# Patient Record
Sex: FEMALE | Race: WHITE | NOT HISPANIC OR LATINO | Employment: FULL TIME | URBAN - METROPOLITAN AREA
[De-identification: names, ages, dates, MRNs, and addresses within clinical notes are randomized per-mention and may not be internally consistent; named-entity substitution may affect disease eponyms.]

---

## 2017-02-24 ENCOUNTER — OFFICE VISIT (OUTPATIENT)
Dept: OPTOMETRY | Facility: CLINIC | Age: 57
End: 2017-02-24
Payer: COMMERCIAL

## 2017-02-24 DIAGNOSIS — H52.13 MYOPIA, BILATERAL: ICD-10-CM

## 2017-02-24 DIAGNOSIS — H25.13 NS (NUCLEAR SCLEROSIS), BILATERAL: ICD-10-CM

## 2017-02-24 DIAGNOSIS — Z46.0 FITTING AND ADJUSTMENT OF SPECTACLES AND CONTACT LENSES: Primary | ICD-10-CM

## 2017-02-24 DIAGNOSIS — I10 ESSENTIAL HYPERTENSION: Primary | ICD-10-CM

## 2017-02-24 DIAGNOSIS — Z83.511 FAMILY HISTORY OF GLAUCOMA IN BROTHER: ICD-10-CM

## 2017-02-24 DIAGNOSIS — H52.4 PRESBYOPIA: ICD-10-CM

## 2017-02-24 PROCEDURE — 92015 DETERMINE REFRACTIVE STATE: CPT | Mod: S$GLB,,, | Performed by: OPTOMETRIST

## 2017-02-24 PROCEDURE — 92310 CONTACT LENS FITTING OU: CPT | Mod: ,,, | Performed by: OPTOMETRIST

## 2017-02-24 PROCEDURE — 92014 COMPRE OPH EXAM EST PT 1/>: CPT | Mod: S$GLB,,, | Performed by: OPTOMETRIST

## 2017-02-24 PROCEDURE — 99999 PR PBB SHADOW E&M-EST. PATIENT-LVL II: CPT | Mod: PBBFAC,,, | Performed by: OPTOMETRIST

## 2017-02-24 NOTE — PROGRESS NOTES
HPI     Patient's age: 56 y.o.    Approximate date of last eye examination:  3/27/15  Name of last eye doctor seen: Dr. Saldana    Pt states that she is here for exam need another prescription for   contacts, may need to give it a little bump up, eye glasses are fine.    Wears glasses? yes       Wears CLs?:  yes             Headaches?  no  Eye pain/discomfort?  no                                                                                     Flashes?  no  Floaters?  no  Diplopia/Double vision?  no    Patient's Ocular History:         Any eye surgeries? no         Family history of eye disease?  Glaucoma - Brother,  Age Related   Macula Degeneration - Father    Significant patient medical history:         1. Diabetes?  no       If yes, IDDM or NIDDM? no   2. HBP?  no                 ! OTC eyedrops currently using:  Refresh - OU PRN   ! Prescription eye meds currently using:  none            Last edited by Sherri Bedoya MA on 2/24/2017  9:25 AM.         Assessment /Plan     For exam results, see Encounter Report.    Essential hypertension    No retinopathy, monitor yearly    NS (nuclear sclerosis), bilateral   Mild, monitor    Myopia, bilateral  Presbyopia   Rx specs   CL fit today: change Power OU   Remove nightly, replace monthly   OK to order if  Happy    Family history of glaucoma in brother   IOP and optic nerve WNL OU today   Monitor yearly

## 2017-07-25 ENCOUNTER — OFFICE VISIT (OUTPATIENT)
Dept: INTERNAL MEDICINE | Facility: CLINIC | Age: 57
End: 2017-07-25
Payer: COMMERCIAL

## 2017-07-25 ENCOUNTER — LAB VISIT (OUTPATIENT)
Dept: LAB | Facility: OTHER | Age: 57
End: 2017-07-25
Attending: INTERNAL MEDICINE
Payer: COMMERCIAL

## 2017-07-25 VITALS
WEIGHT: 169.56 LBS | OXYGEN SATURATION: 99 % | DIASTOLIC BLOOD PRESSURE: 72 MMHG | HEIGHT: 70 IN | HEART RATE: 55 BPM | BODY MASS INDEX: 24.27 KG/M2 | SYSTOLIC BLOOD PRESSURE: 116 MMHG

## 2017-07-25 DIAGNOSIS — Z00.00 WELLNESS EXAMINATION: Primary | ICD-10-CM

## 2017-07-25 DIAGNOSIS — Z00.00 WELLNESS EXAMINATION: ICD-10-CM

## 2017-07-25 LAB
ALBUMIN SERPL BCP-MCNC: 4.5 G/DL
ALP SERPL-CCNC: 81 U/L
ALT SERPL W/O P-5'-P-CCNC: 18 U/L
ANION GAP SERPL CALC-SCNC: 12 MMOL/L
AST SERPL-CCNC: 23 U/L
BASOPHILS # BLD AUTO: 0.09 K/UL
BASOPHILS NFR BLD: 1.9 %
BILIRUB SERPL-MCNC: 0.7 MG/DL
BUN SERPL-MCNC: 10 MG/DL
CALCIUM SERPL-MCNC: 10 MG/DL
CHLORIDE SERPL-SCNC: 102 MMOL/L
CHOLEST/HDLC SERPL: 2.6 {RATIO}
CO2 SERPL-SCNC: 25 MMOL/L
CREAT SERPL-MCNC: 0.8 MG/DL
DIFFERENTIAL METHOD: ABNORMAL
EOSINOPHIL # BLD AUTO: 0.3 K/UL
EOSINOPHIL NFR BLD: 6.5 %
ERYTHROCYTE [DISTWIDTH] IN BLOOD BY AUTOMATED COUNT: 13.3 %
EST. GFR  (AFRICAN AMERICAN): >60 ML/MIN/1.73 M^2
EST. GFR  (NON AFRICAN AMERICAN): >60 ML/MIN/1.73 M^2
GLUCOSE SERPL-MCNC: 95 MG/DL
HCT VFR BLD AUTO: 41 %
HDL/CHOLESTEROL RATIO: 38.3 %
HDLC SERPL-MCNC: 243 MG/DL
HDLC SERPL-MCNC: 93 MG/DL
HGB BLD-MCNC: 13.4 G/DL
LDLC SERPL CALC-MCNC: 137.4 MG/DL
LYMPHOCYTES # BLD AUTO: 2.2 K/UL
LYMPHOCYTES NFR BLD: 46.8 %
MCH RBC QN AUTO: 29.8 PG
MCHC RBC AUTO-ENTMCNC: 32.7 G/DL
MCV RBC AUTO: 91 FL
MONOCYTES # BLD AUTO: 0.5 K/UL
MONOCYTES NFR BLD: 10.1 %
NEUTROPHILS # BLD AUTO: 1.7 K/UL
NEUTROPHILS NFR BLD: 34.5 %
NONHDLC SERPL-MCNC: 150 MG/DL
PLATELET # BLD AUTO: 338 K/UL
PMV BLD AUTO: 9.6 FL
POTASSIUM SERPL-SCNC: 4.3 MMOL/L
PROT SERPL-MCNC: 7.6 G/DL
RBC # BLD AUTO: 4.49 M/UL
SODIUM SERPL-SCNC: 139 MMOL/L
TRIGL SERPL-MCNC: 63 MG/DL
TSH SERPL DL<=0.005 MIU/L-ACNC: 2.54 UIU/ML
WBC # BLD AUTO: 4.77 K/UL

## 2017-07-25 PROCEDURE — 80061 LIPID PANEL: CPT

## 2017-07-25 PROCEDURE — 99396 PREV VISIT EST AGE 40-64: CPT | Mod: S$GLB,,, | Performed by: INTERNAL MEDICINE

## 2017-07-25 PROCEDURE — 80053 COMPREHEN METABOLIC PANEL: CPT

## 2017-07-25 PROCEDURE — 99999 PR PBB SHADOW E&M-EST. PATIENT-LVL III: CPT | Mod: PBBFAC,,, | Performed by: INTERNAL MEDICINE

## 2017-07-25 PROCEDURE — 36415 COLL VENOUS BLD VENIPUNCTURE: CPT

## 2017-07-25 PROCEDURE — 85025 COMPLETE CBC W/AUTO DIFF WBC: CPT

## 2017-07-25 PROCEDURE — 84443 ASSAY THYROID STIM HORMONE: CPT

## 2017-07-25 NOTE — PROGRESS NOTES
Subjective:       Patient ID: Sandra Mitchell is a 56 y.o. female.    Chief Complaint: Annual Exam    Pt here for annual exam. Feels well. Counseled on exercise goals. c-scope is up to date. She is up to date with GYN for WWE and mammogram.           Review of Systems   Constitutional: Negative for activity change, fatigue, fever and unexpected weight change.   HENT: Negative for congestion, hearing loss, rhinorrhea, sore throat and trouble swallowing.    Eyes: Negative for discharge and visual disturbance.   Respiratory: Negative for chest tightness, shortness of breath and wheezing.    Cardiovascular: Negative for chest pain, palpitations and leg swelling.   Gastrointestinal: Negative for abdominal pain, blood in stool, constipation, diarrhea and vomiting.   Endocrine: Negative for polydipsia and polyuria.   Genitourinary: Negative for difficulty urinating, dysuria, hematuria and menstrual problem.   Musculoskeletal: Negative for arthralgias, joint swelling and neck pain.   Skin: Negative for rash.   Neurological: Negative for dizziness, syncope, weakness and headaches.   Hematological: Negative for adenopathy.   Psychiatric/Behavioral: Negative for confusion and dysphoric mood.       Objective:      Physical Exam   Constitutional: She is oriented to person, place, and time. She appears well-developed and well-nourished.   HENT:   Head: Normocephalic.   Right Ear: Tympanic membrane, external ear and ear canal normal.   Left Ear: Tympanic membrane, external ear and ear canal normal.   Nose: Nose normal.   Mouth/Throat: Uvula is midline and oropharynx is clear and moist.   Eyes: Conjunctivae, EOM and lids are normal. Pupils are equal, round, and reactive to light. Right conjunctiva is not injected. Left conjunctiva is not injected.   Neck: Neck supple. No JVD present. Carotid bruit is not present. No thyroid mass and no thyromegaly present.   Cardiovascular: Normal rate, regular rhythm, S1 normal, S2  normal and intact distal pulses.  PMI is not displaced.    No murmur heard.  Pulses:       Posterior tibial pulses are 2+ on the right side, and 2+ on the left side.   Pulmonary/Chest: Effort normal and breath sounds normal. She has no wheezes. She has no rhonchi. She has no rales.   Abdominal: Soft. Bowel sounds are normal. She exhibits no distension and no abdominal bruit. There is no hepatosplenomegaly. There is no tenderness.   Musculoskeletal: She exhibits no edema.   Lymphadenopathy:        Head (right side): No preauricular and no posterior auricular adenopathy present.        Head (left side): No preauricular and no posterior auricular adenopathy present.     She has no cervical adenopathy.     She has no axillary adenopathy.   Neurological: She is alert and oriented to person, place, and time. She has normal strength. No cranial nerve deficit or sensory deficit.   Reflex Scores:       Patellar reflexes are 2+ on the right side and 2+ on the left side.       Achilles reflexes are 2+ on the right side and 2+ on the left side.  Skin: Skin is warm. No rash noted.   Psychiatric: She has a normal mood and affect. Her speech is normal and behavior is normal. Judgment and thought content normal.       Assessment:       1. Wellness examination        Plan:       1. Appropriate labs

## 2018-03-21 ENCOUNTER — TELEPHONE (OUTPATIENT)
Dept: INTERNAL MEDICINE | Facility: CLINIC | Age: 58
End: 2018-03-21

## 2018-03-21 ENCOUNTER — PATIENT OUTREACH (OUTPATIENT)
Dept: ADMINISTRATIVE | Facility: HOSPITAL | Age: 58
End: 2018-03-21

## 2018-03-21 NOTE — PROGRESS NOTES
[facility] is committed to your overall health.  To help you get the most out of each of your visits, we will review your information to make sure you are up to date on all of your recommended tests and/or procedures.       Your PCP  Kota Valdez MD   found that you may be due for:       Health Maintenance Due   Topic Date Due    Mammogram  12/22/2017     Please be prepared to sign release of information so that we obtain medical records for care that you received outside of [facility]. This is done to make sure your medical record is complete and that you receive the best of care. Thank you.           If you have had any of the above done at another facility, please bring the records or information with you so that your record at [facility] will be complete.  If you would like to schedule any of these, please contact me.     If you are currently taking medication, please bring it with you to your appointment for review.     Also, if you have any type of Advanced Directives, please bring them with you to your office visit so we may scan them into your chart.       Thank you for Choosing [facility] for your healthcare needs.        Additional Information  If you have questions, you can email [email or web address] or call [phone]  to talk to our [facility] staff. Remember, [facility] is NOT to be used for urgent needs. For medical emergencies, dial 911.

## 2018-03-21 NOTE — TELEPHONE ENCOUNTER
----- Message from Jodie Grady sent at 3/21/2018  4:19 PM CDT -----  Contact: pt  x_  1st Request  _  2nd Request  _  3rd Request    Who: EVAN CHINCHILLA [[id number]]    Why: Patient is returning a call. Please call before the day is out.    What Number to Call Back:[phone]    When to Expect a call back: (Within 24 hours)    Please return the call at earliest convenience. Thanks!

## 2018-03-21 NOTE — TELEPHONE ENCOUNTER
FYI: Pt states she is under a lot of stress and causing possible shingles. C/o the following:  Large patch of rash(3x3) on back near rib cage, quarter size sternum area, tenderness under arm that wraps near outbreak on back. Noticed last week that she possibly had an outbreak on her gumline. Has chicken pox as an infant. Pt has been scheduled for appt to eval on tomorrow.

## 2018-03-22 ENCOUNTER — OFFICE VISIT (OUTPATIENT)
Dept: INTERNAL MEDICINE | Facility: CLINIC | Age: 58
End: 2018-03-22
Payer: COMMERCIAL

## 2018-03-22 VITALS
DIASTOLIC BLOOD PRESSURE: 64 MMHG | WEIGHT: 172.38 LBS | BODY MASS INDEX: 24.68 KG/M2 | SYSTOLIC BLOOD PRESSURE: 120 MMHG | HEART RATE: 57 BPM | HEIGHT: 70 IN

## 2018-03-22 DIAGNOSIS — Z12.39 SCREENING FOR BREAST CANCER: ICD-10-CM

## 2018-03-22 DIAGNOSIS — B02.9 HERPES ZOSTER WITHOUT COMPLICATION: Primary | ICD-10-CM

## 2018-03-22 PROCEDURE — 99213 OFFICE O/P EST LOW 20 MIN: CPT | Mod: S$GLB,,, | Performed by: INTERNAL MEDICINE

## 2018-03-22 PROCEDURE — 3074F SYST BP LT 130 MM HG: CPT | Mod: CPTII,S$GLB,, | Performed by: INTERNAL MEDICINE

## 2018-03-22 PROCEDURE — 99999 PR PBB SHADOW E&M-EST. PATIENT-LVL III: CPT | Mod: PBBFAC,,, | Performed by: INTERNAL MEDICINE

## 2018-03-22 PROCEDURE — 3078F DIAST BP <80 MM HG: CPT | Mod: CPTII,S$GLB,, | Performed by: INTERNAL MEDICINE

## 2018-03-22 RX ORDER — VALACYCLOVIR HYDROCHLORIDE 1 G/1
1000 TABLET, FILM COATED ORAL 3 TIMES DAILY
Qty: 21 TABLET | Refills: 0 | Status: SHIPPED | OUTPATIENT
Start: 2018-03-22 | End: 2019-08-01

## 2018-03-22 RX ORDER — GABAPENTIN 300 MG/1
300 CAPSULE ORAL 2 TIMES DAILY
Qty: 30 CAPSULE | Refills: 0 | Status: SHIPPED | OUTPATIENT
Start: 2018-03-22 | End: 2019-08-01

## 2018-03-22 NOTE — TELEPHONE ENCOUNTER
----- Message from Do Pereyra sent at 3/22/2018  8:31 AM CDT -----  Contact: EVAN CHINCHILLA [[id number]]  x_  1st Request  _  2nd Request  _  3rd Request        Who: EVAN CHINCHILLA [[id number]]    Why: Returning a call. Patient states she think she may have the shingles. Patient states there is nothing wrong with her eyes she has two rash patches. Please return the call at earliest convenience. Thanks!    What Number to Call Back: [phone]      When to Expect a call back: (Within 24 hours)

## 2018-03-22 NOTE — TELEPHONE ENCOUNTER
Left a 2nd messge regarding eye symptoms. pt was advised in the message to contact our office for more informating regarding symptoms. CF

## 2018-04-26 ENCOUNTER — OFFICE VISIT (OUTPATIENT)
Dept: OPTOMETRY | Facility: CLINIC | Age: 58
End: 2018-04-26
Payer: COMMERCIAL

## 2018-04-26 DIAGNOSIS — Z83.518 FAMILY HISTORY OF MACULAR DEGENERATION: ICD-10-CM

## 2018-04-26 DIAGNOSIS — H25.13 NS (NUCLEAR SCLEROSIS), BILATERAL: Primary | ICD-10-CM

## 2018-04-26 DIAGNOSIS — H52.4 PRESBYOPIA: ICD-10-CM

## 2018-04-26 DIAGNOSIS — I10 ESSENTIAL HYPERTENSION: ICD-10-CM

## 2018-04-26 DIAGNOSIS — Z46.0 FITTING AND ADJUSTMENT OF SPECTACLES AND CONTACT LENSES: Primary | ICD-10-CM

## 2018-04-26 DIAGNOSIS — Z83.511 FAMILY HISTORY OF GLAUCOMA IN BROTHER: ICD-10-CM

## 2018-04-26 DIAGNOSIS — H52.13 MYOPIA, BILATERAL: ICD-10-CM

## 2018-04-26 PROCEDURE — 99999 PR PBB SHADOW E&M-EST. PATIENT-LVL II: CPT | Mod: PBBFAC,,, | Performed by: OPTOMETRIST

## 2018-04-26 PROCEDURE — 92310 CONTACT LENS FITTING OU: CPT | Mod: S$GLB,,, | Performed by: OPTOMETRIST

## 2018-04-26 PROCEDURE — 92014 COMPRE OPH EXAM EST PT 1/>: CPT | Mod: S$GLB,,, | Performed by: OPTOMETRIST

## 2018-04-26 PROCEDURE — 92015 DETERMINE REFRACTIVE STATE: CPT | Mod: S$GLB,,, | Performed by: OPTOMETRIST

## 2018-04-26 RX ORDER — 1.1% SODIUM FLUORIDE PRESCRIPTION DENTAL CREAM 5 MG/G
CREAM DENTAL
Refills: 0 | COMMUNITY
Start: 2018-03-24

## 2018-04-26 NOTE — PROGRESS NOTES
HPI     Patient's age: 56 y.o.    Approximate date of last eye examination:  2/24/17  Name of last eye doctor seen: Dr. Saldana    Pt states that she is here for exam need another prescription for  glasses   and contacts, feel as if eye shifted some with vision.    Wears glasses? yes       Wears CLs?:  yes             Headaches?  no  Eye pain/discomfort?  no                                                                                     Flashes?  no  Floaters?  no  Diplopia/Double vision?  no    Patient's Ocular History:         Any eye surgeries? no         Family history of eye disease?  Glaucoma - Brother,  Age Related   Macula Degeneration - Father    Significant patient medical history:         1. Diabetes?  no       If yes, IDDM or NIDDM? no   2. HBP?  no                 ! OTC eyedrops currently using:  Refresh - OU PRN   ! Prescription eye meds currently using:  none         Last edited by Sherri Bedoya MA on 4/26/2018  9:21 AM. (History)            Assessment /Plan     For exam results, see Encounter Report.        Family history of macular degeneration: father    Essential hypertension              No retinopathy, monitor yearly     NS (nuclear sclerosis), bilateral               Mild, monitor     Myopia, bilateral  Presbyopia               Rx specs               CL fit today: no change OU               Remove nightly, replace monthly               OK to order if  Happy     Family history of glaucoma in brother               IOP and optic nerve appearance WNL OU today               Monitor yearly

## 2018-06-04 ENCOUNTER — OFFICE VISIT (OUTPATIENT)
Dept: OBSTETRICS AND GYNECOLOGY | Facility: CLINIC | Age: 58
End: 2018-06-04
Attending: OBSTETRICS & GYNECOLOGY
Payer: COMMERCIAL

## 2018-06-04 VITALS
WEIGHT: 169.56 LBS | BODY MASS INDEX: 24.27 KG/M2 | SYSTOLIC BLOOD PRESSURE: 118 MMHG | DIASTOLIC BLOOD PRESSURE: 74 MMHG | HEIGHT: 70 IN

## 2018-06-04 DIAGNOSIS — Z12.31 SCREENING MAMMOGRAM, ENCOUNTER FOR: ICD-10-CM

## 2018-06-04 DIAGNOSIS — N95.2 POSTMENOPAUSAL ATROPHIC VAGINITIS: ICD-10-CM

## 2018-06-04 DIAGNOSIS — Z01.419 ENCOUNTER FOR GYNECOLOGICAL EXAMINATION WITHOUT ABNORMAL FINDING: Primary | ICD-10-CM

## 2018-06-04 PROCEDURE — 3074F SYST BP LT 130 MM HG: CPT | Mod: CPTII,S$GLB,, | Performed by: OBSTETRICS & GYNECOLOGY

## 2018-06-04 PROCEDURE — 99396 PREV VISIT EST AGE 40-64: CPT | Mod: S$GLB,,, | Performed by: OBSTETRICS & GYNECOLOGY

## 2018-06-04 PROCEDURE — 3078F DIAST BP <80 MM HG: CPT | Mod: CPTII,S$GLB,, | Performed by: OBSTETRICS & GYNECOLOGY

## 2018-06-04 PROCEDURE — 99999 PR PBB SHADOW E&M-EST. PATIENT-LVL III: CPT | Mod: PBBFAC,,, | Performed by: OBSTETRICS & GYNECOLOGY

## 2018-06-04 RX ORDER — ESTRADIOL 0.1 MG/G
0.5 CREAM VAGINAL
Qty: 42 G | Refills: 4 | Status: SHIPPED | OUTPATIENT
Start: 2018-06-04 | End: 2019-02-06 | Stop reason: SDUPTHER

## 2018-06-04 NOTE — PROGRESS NOTES
Subjective:       Patient ID: Sandra Mitchell is a 57 y.o. female.    Chief Complaint:  Well Woman      History of Present Illness  HPI    Sandra Mitchell is a 57 y.o. female  here for her annual GYN exam.    She describes her periods as stopped with menpause about 11 years ago,  denies break through bleeding.   denies vaginal itching or irritation.  Denies vaginal discharge.  She is sexually active. She has had 1 partner for the past 12 years ( x 4 years) .   History of abnormal pap: No  Last Pap: approximate date  and was normal  Last MMG: normal--routine follow-up in 12 months  Last Colonoscopy:  colonoscopy 5 years ago without abnormalities.  denies domestic violence. She approximate date  and was normal feel safe at home.     History reviewed. No pertinent past medical history.  Past Surgical History:   Procedure Laterality Date    APPENDECTOMY      BREAST CYST INCISION AND DRAINAGE      DILATION AND CURETTAGE OF UTERUS      TUBAL LIGATION      Bilateral salpingectomy     Social History     Social History    Marital status:      Spouse name: N/A    Number of children: N/A    Years of education: N/A     Occupational History    Not on file.     Social History Main Topics    Smoking status: Never Smoker    Smokeless tobacco: Never Used    Alcohol use 2.4 oz/week     4 Glasses of wine per week      Comment: per week    Drug use: No    Sexual activity: Yes     Partners: Male     Birth control/ protection: Post-menopausal      Comment:  x 2 years     Other Topics Concern    Not on file     Social History Narrative    No narrative on file     Family History   Problem Relation Age of Onset    Cataracts Mother     Dementia Mother     Alzheimer's disease Mother     Macular degeneration Father     Heart disease Father         CABG    Diabetes Father     Hypertension Father     Amblyopia Neg Hx     Blindness Neg Hx     Glaucoma Neg Hx      "Retinal detachment Neg Hx     Strabismus Neg Hx     Breast cancer Neg Hx     Colon cancer Neg Hx     Ovarian cancer Neg Hx     Stroke Neg Hx      OB History      Para Term  AB Living    0              SAB TAB Ectopic Multiple Live Births                       /74   Ht 5' 10" (1.778 m)   Wt 76.9 kg (169 lb 8.5 oz)   LMP 2007   BMI 24.33 kg/m²         GYN & OB History  Patient's last menstrual period was 2007.   Date of Last Pap: 2016    OB History    Para Term  AB Living   0             SAB TAB Ectopic Multiple Live Births                         Review of Systems  Review of Systems   Constitutional: Negative for activity change, appetite change, fatigue and unexpected weight change.   HENT: Negative.    Eyes: Negative for visual disturbance.   Respiratory: Negative for shortness of breath and wheezing.    Cardiovascular: Negative for chest pain, palpitations and leg swelling.   Gastrointestinal: Negative for abdominal pain, bloating and blood in stool.   Endocrine: Negative for diabetes and hair loss.   Genitourinary: Negative for decreased libido, dyspareunia and postmenopausal bleeding.   Musculoskeletal: Negative for back pain and joint swelling.   Skin:  Negative for no acne and hair changes.   Neurological: Negative for headaches.   Hematological: Does not bruise/bleed easily.   Psychiatric/Behavioral: Negative for depression and sleep disturbance. The patient is not nervous/anxious.    Breast: Negative for breast pain and nipple discharge          Objective:    Physical Exam:   Constitutional: She is oriented to person, place, and time. She appears well-developed and well-nourished.    HENT:   Head: Normocephalic and atraumatic.    Eyes: EOM are normal. Pupils are equal, round, and reactive to light.    Neck: Normal range of motion. Neck supple.    Cardiovascular: Normal rate and regular rhythm.     Pulmonary/Chest: Effort normal and breath sounds " normal.   BREASTS:  no mass, no tenderness, no deformity and no retraction. Right breast exhibits no inverted nipple, no mass, no nipple discharge, no skin change, no tenderness, no bleeding and no swelling. Left breast exhibits no inverted nipple, no mass, no nipple discharge, no skin change, no tenderness, no bleeding and no swelling. Breasts are symmetrical.              Abdominal: Soft. Bowel sounds are normal.     Genitourinary: Pelvic exam was performed with patient supine.   Genitourinary Comments: PELVIC: Normal external genitalia without lesions.  Normal hair distribution.  Adequate perineal body, normal urethral meatus.  Vagina moist and well rugated without lesions or discharge.  Cervix pink, without lesions, discharge or tenderness.  No significant cystocele or rectocele.  Bimanual exam shows uterus to be normal size, regular, mobile and nontender.  Adnexa without masses or tenderness.               Musculoskeletal: Normal range of motion and moves all extremeties.       Neurological: She is alert and oriented to person, place, and time.    Skin: Skin is warm and dry.    Psychiatric: She has a normal mood and affect.          Assessment:        1. Encounter for gynecological examination without abnormal finding    2. Screening mammogram, encounter for    3. Postmenopausal atrophic vaginitis               Plan:        1. Encounter for gynecological examination without abnormal finding  COUNSELING:  The patient was counseled today on osteoporosis prevention, calcium supplementation, and regular weight bearing exercise. The patient was also counseled today on ACS PAP guidelines, with recommendations for yearly pelvic exams unless their uterus, cervix, and ovaries were removed for benign reasons; in that case, examinations every 3-5 years are recommended. The patient was also counseled regarding monthly breast self-examination, routine STD screening for at-risk populations, prophylactic immunizations for  transmitted infections such as HPV, Pertussis, or Influenza as appropriate, and yearly mammograms when indicated by ACS guidelines. She was advised to see her primary care physician for all other health maintenance.   FOLLOW-UP with me for next routine visit.         2. Screening mammogram, encounter for    - Mammo Digital Screening Bilat with Tomosynthesis CAD; Future    3. Postmenopausal atrophic vaginitis    - estradiol (ESTRACE) 0.01 % (0.1 mg/gram) vaginal cream; Place 0.5 g vaginally twice a week. Insert 0.5grams intravaginally twice weekly  Dispense: 42 g; Refill: 4       Follow-up in about 1 year (around 6/4/2019).

## 2018-08-07 ENCOUNTER — TELEPHONE (OUTPATIENT)
Dept: INTERNAL MEDICINE | Facility: CLINIC | Age: 58
End: 2018-08-07

## 2018-08-07 NOTE — TELEPHONE ENCOUNTER
----- Message from Maria Ines Palmer sent at 8/7/2018  9:01 AM CDT -----  Contact: pt            Name of Who is Calling: pt      What is the request in detail: pt needs shingles vaccine. Please call pt      Can the clinic reply by [facility]: no      [facility] Back [facility]: [phone]

## 2018-08-09 ENCOUNTER — PATIENT MESSAGE (OUTPATIENT)
Dept: INTERNAL MEDICINE | Facility: CLINIC | Age: 58
End: 2018-08-09

## 2018-09-04 ENCOUNTER — PATIENT MESSAGE (OUTPATIENT)
Dept: OPTOMETRY | Facility: CLINIC | Age: 58
End: 2018-09-04

## 2018-10-15 ENCOUNTER — HOSPITAL ENCOUNTER (OUTPATIENT)
Dept: RADIOLOGY | Facility: HOSPITAL | Age: 58
Discharge: HOME | End: 2018-10-15
Attending: INTERNAL MEDICINE
Payer: COMMERCIAL

## 2018-10-15 DIAGNOSIS — Z12.39 SCREENING FOR BREAST CANCER: ICD-10-CM

## 2018-10-16 ENCOUNTER — HOSPITAL ENCOUNTER (OUTPATIENT)
Dept: RADIOLOGY | Facility: HOSPITAL | Age: 58
Discharge: HOME | End: 2018-10-16
Attending: INTERNAL MEDICINE
Payer: COMMERCIAL

## 2018-10-16 PROCEDURE — 77063 BREAST TOMOSYNTHESIS BI: CPT | Mod: TC

## 2018-10-16 PROCEDURE — 77063 BREAST TOMOSYNTHESIS BI: CPT | Mod: 26,,, | Performed by: RADIOLOGY

## 2018-10-16 PROCEDURE — 77067 SCR MAMMO BI INCL CAD: CPT | Mod: 26,,, | Performed by: RADIOLOGY

## 2018-10-16 PROCEDURE — 77067 SCR MAMMO BI INCL CAD: CPT | Mod: TC

## 2019-02-05 ENCOUNTER — PATIENT MESSAGE (OUTPATIENT)
Dept: OBSTETRICS AND GYNECOLOGY | Facility: CLINIC | Age: 59
End: 2019-02-05

## 2019-02-05 ENCOUNTER — TELEPHONE (OUTPATIENT)
Dept: OBSTETRICS AND GYNECOLOGY | Facility: CLINIC | Age: 59
End: 2019-02-05

## 2019-02-05 DIAGNOSIS — N95.2 POSTMENOPAUSAL ATROPHIC VAGINITIS: ICD-10-CM

## 2019-02-06 RX ORDER — ESTRADIOL 0.1 MG/G
0.5 CREAM VAGINAL
Qty: 42 G | Refills: 4 | Status: SHIPPED | OUTPATIENT
Start: 2019-02-07 | End: 2019-02-12

## 2019-02-12 RX ORDER — ESTRADIOL 0.1 MG/G
CREAM VAGINAL
Qty: 42 G | Refills: 4 | Status: SHIPPED | OUTPATIENT
Start: 2019-02-12 | End: 2019-08-09 | Stop reason: SDUPTHER

## 2019-03-08 ENCOUNTER — PATIENT MESSAGE (OUTPATIENT)
Dept: OBSTETRICS AND GYNECOLOGY | Facility: CLINIC | Age: 59
End: 2019-03-08

## 2019-08-01 ENCOUNTER — OFFICE VISIT (OUTPATIENT)
Dept: INTERNAL MEDICINE | Facility: CLINIC | Age: 59
End: 2019-08-01
Payer: COMMERCIAL

## 2019-08-01 ENCOUNTER — HOSPITAL ENCOUNTER (OUTPATIENT)
Dept: RADIOLOGY | Facility: OTHER | Age: 59
Discharge: HOME | End: 2019-08-01
Attending: INTERNAL MEDICINE
Payer: COMMERCIAL

## 2019-08-01 VITALS
OXYGEN SATURATION: 97 % | DIASTOLIC BLOOD PRESSURE: 70 MMHG | WEIGHT: 169.31 LBS | BODY MASS INDEX: 24.24 KG/M2 | HEIGHT: 70 IN | SYSTOLIC BLOOD PRESSURE: 112 MMHG | HEART RATE: 62 BPM

## 2019-08-01 DIAGNOSIS — M85.89 OSTEOPENIA OF MULTIPLE SITES: ICD-10-CM

## 2019-08-01 DIAGNOSIS — Z00.00 WELLNESS EXAMINATION: Primary | ICD-10-CM

## 2019-08-01 PROCEDURE — 77080 DXA BONE DENSITY AXIAL: CPT | Mod: TC

## 2019-08-01 PROCEDURE — 77080 DEXA BONE DENSITY SPINE HIP: ICD-10-PCS | Mod: 26,,, | Performed by: RADIOLOGY

## 2019-08-01 PROCEDURE — 99396 PR PREVENTIVE VISIT,EST,40-64: ICD-10-PCS | Mod: S$GLB,,, | Performed by: INTERNAL MEDICINE

## 2019-08-01 PROCEDURE — 99396 PREV VISIT EST AGE 40-64: CPT | Mod: S$GLB,,, | Performed by: INTERNAL MEDICINE

## 2019-08-01 PROCEDURE — 3074F PR MOST RECENT SYSTOLIC BLOOD PRESSURE < 130 MM HG: ICD-10-PCS | Mod: CPTII,S$GLB,, | Performed by: INTERNAL MEDICINE

## 2019-08-01 PROCEDURE — 3078F PR MOST RECENT DIASTOLIC BLOOD PRESSURE < 80 MM HG: ICD-10-PCS | Mod: CPTII,S$GLB,, | Performed by: INTERNAL MEDICINE

## 2019-08-01 PROCEDURE — 99999 PR PBB SHADOW E&M-EST. PATIENT-LVL III: ICD-10-PCS | Mod: PBBFAC,,, | Performed by: INTERNAL MEDICINE

## 2019-08-01 PROCEDURE — 99999 PR PBB SHADOW E&M-EST. PATIENT-LVL III: CPT | Mod: PBBFAC,,, | Performed by: INTERNAL MEDICINE

## 2019-08-01 PROCEDURE — 77080 DXA BONE DENSITY AXIAL: CPT | Mod: 26,,, | Performed by: RADIOLOGY

## 2019-08-01 PROCEDURE — 3074F SYST BP LT 130 MM HG: CPT | Mod: CPTII,S$GLB,, | Performed by: INTERNAL MEDICINE

## 2019-08-01 PROCEDURE — 3078F DIAST BP <80 MM HG: CPT | Mod: CPTII,S$GLB,, | Performed by: INTERNAL MEDICINE

## 2019-08-01 NOTE — PROGRESS NOTES
Subjective:       Patient ID: Sandra Mitchell is a 58 y.o. female.    Chief Complaint: Annual Exam    Pt here for annual exam. Counseled on exercise goals. She is up to date with GYN for WWE and mammogram. Other health maint is up to date.     She has osteopenia on prior DEXA. Due for update. On ca/d.       Review of Systems   Constitutional: Negative for activity change, fatigue, fever and unexpected weight change.   HENT: Negative for congestion, hearing loss, rhinorrhea, sore throat and trouble swallowing.    Eyes: Negative for discharge and visual disturbance.   Respiratory: Negative for chest tightness, shortness of breath and wheezing.    Cardiovascular: Negative for chest pain, palpitations and leg swelling.   Gastrointestinal: Negative for abdominal pain, blood in stool, constipation, diarrhea and vomiting.   Endocrine: Negative for polydipsia and polyuria.   Genitourinary: Negative for difficulty urinating, dysuria, hematuria and menstrual problem.   Musculoskeletal: Negative for arthralgias, joint swelling and neck pain.   Skin: Negative for rash.   Neurological: Negative for dizziness, syncope, weakness and headaches.   Hematological: Negative for adenopathy.   Psychiatric/Behavioral: Negative for confusion and dysphoric mood.       Objective:      Physical Exam   Constitutional: She is oriented to person, place, and time. She appears well-developed and well-nourished.   HENT:   Head: Normocephalic.   Right Ear: Tympanic membrane, external ear and ear canal normal.   Left Ear: Tympanic membrane, external ear and ear canal normal.   Nose: Nose normal.   Mouth/Throat: Uvula is midline and oropharynx is clear and moist.   Eyes: Pupils are equal, round, and reactive to light. Conjunctivae, EOM and lids are normal. Right conjunctiva is not injected. Left conjunctiva is not injected.   Neck: Neck supple. No JVD present. Carotid bruit is not present. No thyroid mass and no thyromegaly present.    Cardiovascular: Normal rate, regular rhythm, S1 normal, S2 normal and intact distal pulses. PMI is not displaced.   No murmur heard.  Pulses:       Posterior tibial pulses are 2+ on the right side, and 2+ on the left side.   Pulmonary/Chest: Effort normal and breath sounds normal. She has no wheezes. She has no rhonchi. She has no rales.   Abdominal: Soft. Bowel sounds are normal. She exhibits no distension and no abdominal bruit. There is no hepatosplenomegaly. There is no tenderness.   Musculoskeletal: She exhibits no edema.   Lymphadenopathy:        Head (right side): No preauricular and no posterior auricular adenopathy present.        Head (left side): No preauricular and no posterior auricular adenopathy present.     She has no cervical adenopathy.     She has no axillary adenopathy.   Neurological: She is alert and oriented to person, place, and time. She has normal strength. No cranial nerve deficit or sensory deficit.   Reflex Scores:       Patellar reflexes are 2+ on the right side and 2+ on the left side.       Achilles reflexes are 2+ on the right side and 2+ on the left side.  Skin: Skin is warm. No rash noted.   Psychiatric: She has a normal mood and affect. Her speech is normal and behavior is normal. Judgment and thought content normal.       Assessment:       1. Wellness examination    2. Osteopenia of multiple sites        Plan:       1. Appropriate labs  2. DEXA

## 2019-08-09 ENCOUNTER — OFFICE VISIT (OUTPATIENT)
Dept: OBSTETRICS AND GYNECOLOGY | Facility: CLINIC | Age: 59
End: 2019-08-09
Payer: COMMERCIAL

## 2019-08-09 VITALS
HEART RATE: 59 BPM | DIASTOLIC BLOOD PRESSURE: 73 MMHG | BODY MASS INDEX: 24.55 KG/M2 | WEIGHT: 171.5 LBS | SYSTOLIC BLOOD PRESSURE: 120 MMHG | HEIGHT: 70 IN

## 2019-08-09 DIAGNOSIS — Z12.31 SCREENING MAMMOGRAM, ENCOUNTER FOR: ICD-10-CM

## 2019-08-09 DIAGNOSIS — Z01.419 ENCOUNTER FOR GYNECOLOGICAL EXAMINATION WITHOUT ABNORMAL FINDING: Primary | ICD-10-CM

## 2019-08-09 DIAGNOSIS — Z12.4 PAP SMEAR FOR CERVICAL CANCER SCREENING: ICD-10-CM

## 2019-08-09 DIAGNOSIS — N95.2 POSTMENOPAUSAL ATROPHIC VAGINITIS: ICD-10-CM

## 2019-08-09 PROCEDURE — 99396 PREV VISIT EST AGE 40-64: CPT | Mod: S$GLB,,, | Performed by: OBSTETRICS & GYNECOLOGY

## 2019-08-09 PROCEDURE — 99999 PR PBB SHADOW E&M-EST. PATIENT-LVL III: CPT | Mod: PBBFAC,,, | Performed by: OBSTETRICS & GYNECOLOGY

## 2019-08-09 PROCEDURE — 99999 PR PBB SHADOW E&M-EST. PATIENT-LVL III: ICD-10-PCS | Mod: PBBFAC,,, | Performed by: OBSTETRICS & GYNECOLOGY

## 2019-08-09 PROCEDURE — 3078F PR MOST RECENT DIASTOLIC BLOOD PRESSURE < 80 MM HG: ICD-10-PCS | Mod: CPTII,S$GLB,, | Performed by: OBSTETRICS & GYNECOLOGY

## 2019-08-09 PROCEDURE — 88175 CYTOPATH C/V AUTO FLUID REDO: CPT

## 2019-08-09 PROCEDURE — 99396 PR PREVENTIVE VISIT,EST,40-64: ICD-10-PCS | Mod: S$GLB,,, | Performed by: OBSTETRICS & GYNECOLOGY

## 2019-08-09 PROCEDURE — 3074F PR MOST RECENT SYSTOLIC BLOOD PRESSURE < 130 MM HG: ICD-10-PCS | Mod: CPTII,S$GLB,, | Performed by: OBSTETRICS & GYNECOLOGY

## 2019-08-09 PROCEDURE — 3074F SYST BP LT 130 MM HG: CPT | Mod: CPTII,S$GLB,, | Performed by: OBSTETRICS & GYNECOLOGY

## 2019-08-09 PROCEDURE — 87624 HPV HI-RISK TYP POOLED RSLT: CPT

## 2019-08-09 PROCEDURE — 3078F DIAST BP <80 MM HG: CPT | Mod: CPTII,S$GLB,, | Performed by: OBSTETRICS & GYNECOLOGY

## 2019-08-09 RX ORDER — ESTRADIOL 0.1 MG/G
CREAM VAGINAL
Qty: 42 G | Refills: 4 | Status: SHIPPED | OUTPATIENT
Start: 2019-08-09 | End: 2020-01-24 | Stop reason: ALTCHOICE

## 2019-08-09 NOTE — PROGRESS NOTES
Subjective:       Patient ID: Sandra Mitchell is a 58 y.o. female.    Chief Complaint:  Well Woman      History of Present Illness  HPI    Sandra Mitchell is a 58 y.o. female  here for her annual GYN exam.    She describes her periods as stopped with menopause about 10 years ago,   denies break through bleeding.   denies vaginal itching or irritation.  Denies vaginal discharge.  She is sexually active. She has had 1 partner for the past 5 years .  .   History of abnormal pap: No  Last Pap: approximate date  and was normal  Last MMG: normal--routine follow-up in 12 months  Last Colonoscopy:  colonoscopy 6 years ago without abnormalities.  denies domestic violence. She does feel safe at home.     History reviewed. No pertinent past medical history.  Past Surgical History:   Procedure Laterality Date    APPENDECTOMY      BREAST CYST ASPIRATION      BREAST CYST INCISION AND DRAINAGE      DILATION AND CURETTAGE OF UTERUS      [id number] N/A 2015    [facility]    POLYPECTOMY-HYSTEROSCOPIC N/A 2015    [facility]    TUBAL LIGATION      Bilateral salpingectomy     Social History     Socioeconomic History    Marital status:      Spouse name: Not on file    Number of children: Not on file    Years of education: Not on file    Highest education level: Not on file   Occupational History    Not on file   Social Needs    Financial resource strain: Not hard at all    Food insecurity:     Worry: Never true     Inability: Never true    Transportation needs:     Medical: No     Non-medical: No   Tobacco Use    Smoking status: Never Smoker    Smokeless tobacco: Never Used   Substance and Sexual Activity    Alcohol use: Yes     Alcohol/week: 2.4 oz     Types: 4 Glasses of wine per week     Frequency: 2-3 times a week     Drinks per session: 1 or 2     Binge frequency: Never     " Comment: per week    Drug use: No    Sexual activity: Yes     Partners: Male     Birth control/protection: Post-menopausal     Comment:  since    Lifestyle    Physical activity:     Days per week: 4 days     Minutes per session: 60 min    Stress: Not at all   Relationships    Social connections:     Talks on phone: Three times a week     Gets together: Once a week     Attends Voodoo service: Not on file     Active member of club or organization: Yes     Attends meetings of clubs or organizations: More than 4 times per year     Relationship status:    Other Topics Concern    Not on file   Social History Narrative    Not on file     Family History   Problem Relation Age of Onset    Cataracts Mother     Dementia Mother     Alzheimer's disease Mother     Macular degeneration Father     Heart disease Father         CABG    Diabetes Father     Hypertension Father     Breast cancer Paternal Aunt     Amblyopia Neg Hx     Blindness Neg Hx     Glaucoma Neg Hx     Retinal detachment Neg Hx     Strabismus Neg Hx     Colon cancer Neg Hx     Ovarian cancer Neg Hx     Stroke Neg Hx      OB History        0    Para   0    Term   0       0    AB   0    Living   0       SAB   0    TAB   0    Ectopic   0    Multiple   0    Live Births   0                 /73   Pulse (!) 59   Ht 5' 10" (1.778 m)   Wt 77.8 kg (171 lb 8.3 oz)   LMP 2007   BMI 24.61 kg/m²         GYN & OB History  Patient's last menstrual period was 2007.   Date of Last Pap: 2016    OB History    Para Term  AB Living   0 0 0 0 0 0   SAB TAB Ectopic Multiple Live Births   0 0 0 0 0       Review of Systems  Review of Systems   Constitutional: Negative for activity change, appetite change, fatigue and unexpected weight change.   HENT: Negative.    Eyes: Negative for visual disturbance.   Respiratory: Negative for shortness of breath and wheezing.    Cardiovascular: Negative " for chest pain, palpitations and leg swelling.   Gastrointestinal: Negative for abdominal pain, bloating and blood in stool.   Endocrine: Negative for diabetes, hair loss and hot flashes.   Genitourinary: Positive for vaginal dryness. Negative for decreased libido, dyspareunia and hot flashes.   Musculoskeletal: Negative for back pain and joint swelling.   Integumentary:  Negative for acne, hair changes and nipple discharge.   Neurological: Negative for headaches.   Hematological: Does not bruise/bleed easily.   Psychiatric/Behavioral: Negative for depression and sleep disturbance. The patient is not nervous/anxious.    Breast: Negative for mastodynia and nipple discharge          Objective:      Physical Exam:   Constitutional: She is oriented to person, place, and time. She appears well-developed and well-nourished.    HENT:   Head: Normocephalic and atraumatic.    Eyes: Pupils are equal, round, and reactive to light. EOM are normal.    Neck: Normal range of motion. Neck supple.    Cardiovascular: Normal rate and regular rhythm.     Pulmonary/Chest: Effort normal and breath sounds normal.   BREASTS:  no mass, no tenderness, no deformity and no retraction. Right breast exhibits no inverted nipple, no mass, no nipple discharge, no skin change, no tenderness, no bleeding and no swelling. Left breast exhibits no inverted nipple, no mass, no nipple discharge, no skin change, no tenderness, no bleeding and no swelling. Breasts are symmetrical.                Abdominal: Soft. Bowel sounds are normal.     Genitourinary: Pelvic exam was performed with patient supine.   Genitourinary Comments: PELVIC: Normal external genitalia without lesions.  Normal hair distribution.  Adequate perineal body, normal urethral meatus.  Vagina  Dry and poorly rugated, atrophic, without lesions or discharge.  Cervix pink, without lesions, discharge or tenderness.  No significant cystocele or rectocele.  Bimanual exam shows uterus to be normal  size, regular, mobile and nontender.  Adnexa without masses or tenderness.    RECTAL:Deferred             Musculoskeletal: Normal range of motion and moves all extremeties.       Neurological: She is alert and oriented to person, place, and time.    Skin: Skin is warm and dry.    Psychiatric: She has a normal mood and affect.              Assessment:        1. Encounter for gynecological examination without abnormal finding    2. Pap smear for cervical cancer screening    3. Screening mammogram, encounter for    4. Postmenopausal atrophic vaginitis                Plan:        1. Encounter for gynecological examination without abnormal finding  COUNSELING:  The patient was counseled today on regular weight bearing exercise. Patient was counseled today on the new ACS guidelines for cervical cytology screening as well as the current recommendations for breast cancer screening. Counseling session lasted approximately 10 minutes, and all her questions were answered. She was advised to see her primary care physician for all other health maintenance.   FOLLOW-UP with me for next routine visit.         2. Pap smear for cervical cancer screening    - Liquid-based pap smear, screening  - HPV High Risk Genotypes, PCR    3. Screening mammogram, encounter for    - Mammo Digital Screening Bilat w/ [facility]; Future    4. Postmenopausal atrophic vaginitis    - estradiol (ESTRACE) 0.01 % (0.1 mg/gram) vaginal cream; Place 0.5 g vaginally twice a week. Insert 0.5grams intravaginally twice weekly - Vaginal  Dispense: 42 g; Refill: 4       Follow up in about 1 year (around 8/9/2020).

## 2019-08-14 LAB
HPV HR 12 DNA CVX QL NAA+PROBE: NEGATIVE
HPV16 AG SPEC QL: NEGATIVE
HPV18 DNA SPEC QL NAA+PROBE: NEGATIVE

## 2019-08-26 ENCOUNTER — OFFICE VISIT (OUTPATIENT)
Dept: OPTOMETRY | Facility: CLINIC | Age: 59
End: 2019-08-26
Payer: COMMERCIAL

## 2019-08-26 DIAGNOSIS — I10 ESSENTIAL HYPERTENSION: ICD-10-CM

## 2019-08-26 DIAGNOSIS — Z83.511 FAMILY HISTORY OF GLAUCOMA IN BROTHER: ICD-10-CM

## 2019-08-26 DIAGNOSIS — H25.13 NS (NUCLEAR SCLEROSIS), BILATERAL: Primary | ICD-10-CM

## 2019-08-26 DIAGNOSIS — H52.13 MYOPIA, BILATERAL: ICD-10-CM

## 2019-08-26 DIAGNOSIS — H52.4 PRESBYOPIA: ICD-10-CM

## 2019-08-26 DIAGNOSIS — Z83.518 FAMILY HISTORY OF MACULAR DEGENERATION: ICD-10-CM

## 2019-08-26 PROCEDURE — 92014 PR EYE EXAM, EST PATIENT,COMPREHESV: ICD-10-PCS | Mod: S$GLB,,, | Performed by: OPTOMETRIST

## 2019-08-26 PROCEDURE — 99999 PR PBB SHADOW E&M-EST. PATIENT-LVL II: ICD-10-PCS | Mod: PBBFAC,,, | Performed by: OPTOMETRIST

## 2019-08-26 PROCEDURE — 92014 COMPRE OPH EXAM EST PT 1/>: CPT | Mod: S$GLB,,, | Performed by: OPTOMETRIST

## 2019-08-26 PROCEDURE — 99999 PR PBB SHADOW E&M-EST. PATIENT-LVL II: CPT | Mod: PBBFAC,,, | Performed by: OPTOMETRIST

## 2019-08-26 PROCEDURE — 92015 DETERMINE REFRACTIVE STATE: CPT | Mod: S$GLB,,, | Performed by: OPTOMETRIST

## 2019-08-26 PROCEDURE — 92015 PR REFRACTION: ICD-10-PCS | Mod: S$GLB,,, | Performed by: OPTOMETRIST

## 2019-09-03 NOTE — PROGRESS NOTES
HPI     Ms. Mitchell is here for an annual eye exam. Last eye exam 04/26/18 with   Dr. Saldana.   Pt c/o of occasional blurriness in the evening. She is wearing a 1 yr old   tri-focal and reports clear vision at all ranges. She wears contact lenses   once a week.   No flashes, floaters, or diplopia  Rewetting drop when wearing contacts  No eye sx or injuries          Last edited by Dena Boswell on 8/26/2019  2:42 PM. (History)            Assessment /Plan     For exam results, see Encounter Report.          Family history of macular degeneration: father     Essential hypertension              No retinopathy, monitor yearly     NS (nuclear sclerosis), bilateral               Mild, monitor     Myopia, bilateral  Presbyopia               Rx specs, pt prefers Lined bifocal and separate Computer/ reading Rx                Family history of glaucoma in brother               IOP and optic nerve appearance WNL OU today               Monitor yearly      RTC 1 year, sooner PRN

## 2019-09-17 ENCOUNTER — OFFICE VISIT (OUTPATIENT)
Dept: OPTOMETRY | Facility: CLINIC | Age: 59
End: 2019-09-17
Payer: COMMERCIAL

## 2019-09-17 DIAGNOSIS — H52.4 PRESBYOPIA: Primary | ICD-10-CM

## 2019-09-17 PROCEDURE — 99499 UNLISTED E&M SERVICE: CPT | Mod: S$GLB,,, | Performed by: OPTOMETRIST

## 2019-09-17 PROCEDURE — 99499 NO LOS: ICD-10-PCS | Mod: S$GLB,,, | Performed by: OPTOMETRIST

## 2019-09-17 PROCEDURE — 99999 PR PBB SHADOW E&M-EST. PATIENT-LVL II: ICD-10-PCS | Mod: PBBFAC,,, | Performed by: OPTOMETRIST

## 2019-09-17 PROCEDURE — 99999 PR PBB SHADOW E&M-EST. PATIENT-LVL II: CPT | Mod: PBBFAC,,, | Performed by: OPTOMETRIST

## 2019-09-17 NOTE — PROGRESS NOTES
HPI     Pt says new glasses long distance is fine but mid distance is very blurry   and her near vision is very blurry and computer distance as well.     Last edited by Ana Gonzalez on 9/17/2019 10:41 AM. (History)            Assessment /Plan     For exam results, see Encounter Report.    Presbyopia      Rx change Remake OU

## 2020-01-24 ENCOUNTER — PATIENT MESSAGE (OUTPATIENT)
Dept: OBSTETRICS AND GYNECOLOGY | Facility: CLINIC | Age: 60
End: 2020-01-24

## 2020-01-24 DIAGNOSIS — N95.2 POSTMENOPAUSAL ATROPHIC VAGINITIS: Primary | ICD-10-CM

## 2020-01-31 ENCOUNTER — PATIENT MESSAGE (OUTPATIENT)
Dept: OBSTETRICS AND GYNECOLOGY | Facility: CLINIC | Age: 60
End: 2020-01-31

## 2020-02-03 DIAGNOSIS — N95.2 POSTMENOPAUSAL ATROPHIC VAGINITIS: Primary | ICD-10-CM

## 2020-02-03 RX ORDER — ESTRADIOL 0.1 MG/G
0.5 CREAM VAGINAL
Qty: 42 G | Refills: 4 | Status: SHIPPED | OUTPATIENT
Start: 2020-02-03 | End: 2021-08-02 | Stop reason: SDUPTHER

## 2020-02-26 ENCOUNTER — OFFICE VISIT (OUTPATIENT)
Dept: INTERNAL MEDICINE | Facility: CLINIC | Age: 60
End: 2020-02-26
Payer: COMMERCIAL

## 2020-02-26 VITALS
OXYGEN SATURATION: 98 % | TEMPERATURE: 98 F | DIASTOLIC BLOOD PRESSURE: 70 MMHG | WEIGHT: 172.38 LBS | SYSTOLIC BLOOD PRESSURE: 113 MMHG | HEART RATE: 70 BPM | HEIGHT: 70 IN | BODY MASS INDEX: 24.68 KG/M2

## 2020-02-26 DIAGNOSIS — J40 BRONCHITIS: Primary | ICD-10-CM

## 2020-02-26 PROCEDURE — 3008F PR BODY MASS INDEX (BMI) DOCUMENTED: ICD-10-PCS | Mod: CPTII,S$GLB,, | Performed by: PHYSICIAN ASSISTANT

## 2020-02-26 PROCEDURE — 3078F DIAST BP <80 MM HG: CPT | Mod: CPTII,S$GLB,, | Performed by: PHYSICIAN ASSISTANT

## 2020-02-26 PROCEDURE — 99214 PR OFFICE/OUTPT VISIT, EST, LEVL IV, 30-39 MIN: ICD-10-PCS | Mod: S$GLB,,, | Performed by: PHYSICIAN ASSISTANT

## 2020-02-26 PROCEDURE — 3008F BODY MASS INDEX DOCD: CPT | Mod: CPTII,S$GLB,, | Performed by: PHYSICIAN ASSISTANT

## 2020-02-26 PROCEDURE — 3074F SYST BP LT 130 MM HG: CPT | Mod: CPTII,S$GLB,, | Performed by: PHYSICIAN ASSISTANT

## 2020-02-26 PROCEDURE — 99999 PR PBB SHADOW E&M-EST. PATIENT-LVL IV: CPT | Mod: PBBFAC,,, | Performed by: PHYSICIAN ASSISTANT

## 2020-02-26 PROCEDURE — 99999 PR PBB SHADOW E&M-EST. PATIENT-LVL IV: ICD-10-PCS | Mod: PBBFAC,,, | Performed by: PHYSICIAN ASSISTANT

## 2020-02-26 PROCEDURE — 99214 OFFICE O/P EST MOD 30 MIN: CPT | Mod: S$GLB,,, | Performed by: PHYSICIAN ASSISTANT

## 2020-02-26 PROCEDURE — 3078F PR MOST RECENT DIASTOLIC BLOOD PRESSURE < 80 MM HG: ICD-10-PCS | Mod: CPTII,S$GLB,, | Performed by: PHYSICIAN ASSISTANT

## 2020-02-26 PROCEDURE — 3074F PR MOST RECENT SYSTOLIC BLOOD PRESSURE < 130 MM HG: ICD-10-PCS | Mod: CPTII,S$GLB,, | Performed by: PHYSICIAN ASSISTANT

## 2020-02-26 RX ORDER — BENZONATATE 100 MG/1
100 CAPSULE ORAL 3 TIMES DAILY PRN
Qty: 20 CAPSULE | Refills: 0 | Status: SHIPPED | OUTPATIENT
Start: 2020-02-26 | End: 2020-03-07

## 2020-02-26 RX ORDER — PROMETHAZINE HYDROCHLORIDE AND DEXTROMETHORPHAN HYDROBROMIDE 6.25; 15 MG/5ML; MG/5ML
5 SYRUP ORAL NIGHTLY PRN
Qty: 118 ML | Refills: 0 | Status: SHIPPED | OUTPATIENT
Start: 2020-02-26 | End: 2020-03-07

## 2020-02-26 NOTE — PROGRESS NOTES
Subjective:       Patient ID: Sandra Mitchell is a 59 y.o. female.    Chief Complaint: Cough (ongoing for over 1 week) and Headache    Established pt of Kota Valdez MD (new to me)    Cough   This is a new problem. The current episode started 1 to 4 weeks ago. The problem has been waxing and waning. The cough is productive of sputum. Associated symptoms include ear congestion, headaches, nasal congestion and a sore throat (now resolved). Pertinent negatives include no chest pain, chills, ear pain, fever, hemoptysis, postnasal drip, rash, shortness of breath, sweats or wheezing. The symptoms are aggravated by lying down. She has tried body position changes, OTC cough suppressant and rest (Robitussin, Sudafed, Ibuprofen) for the symptoms. The treatment provided moderate relief. There is no history of asthma or COPD.          Sore throat(resolved), hoarse(resolved), cough.   Worse at night  Taking robitussin, Ibuprofen snd sudafed  Some nasal congestion.   No fever or sweats.     No past medical history on file.    Social History     Tobacco Use    Smoking status: Never Smoker    Smokeless tobacco: Never Used   Substance Use Topics    Alcohol use: Yes     Alcohol/week: 4.0 standard drinks     Types: 4 Glasses of wine per week     Frequency: 2-3 times a week     Drinks per session: 1 or 2     Binge frequency: Never     Comment: per week    Drug use: No     Review of patient's allergies indicates:   Allergen Reactions    Aspirin Hives     Other reaction(s): Hives     Patient Active Problem List   Diagnosis    Lumbar radiculopathy         Review of Systems   Constitutional: Positive for fatigue. Negative for chills, diaphoresis and fever.   HENT: Positive for congestion and sore throat (now resolved). Negative for ear pain, postnasal drip, sinus pressure, sinus pain and trouble swallowing.    Respiratory: Positive for cough. Negative for hemoptysis, shortness of breath and wheezing.   "  Cardiovascular: Negative for chest pain and leg swelling.   Gastrointestinal: Negative for nausea and vomiting.   Skin: Negative for rash.   Neurological: Positive for headaches.   Hematological: Negative for adenopathy.       Objective: /70 (BP Location: Right arm, Patient Position: Sitting, BP Method: Large (Manual))   Pulse 70   Temp 97.8 °F (36.6 °C) (Oral)   Ht 5' 10" (1.778 m)   Wt 78.2 kg (172 lb 6.4 oz)   LMP 12/20/2007   SpO2 98%   BMI 24.74 kg/m²         Physical Exam   Constitutional: She appears well-developed and well-nourished. No distress.   HENT:   Head: Normocephalic and atraumatic.   Right Ear: Tympanic membrane, external ear and ear canal normal.   Left Ear: External ear and ear canal normal. A middle ear effusion is present.   Nose: No mucosal edema. Right sinus exhibits no maxillary sinus tenderness and no frontal sinus tenderness. Left sinus exhibits no maxillary sinus tenderness and no frontal sinus tenderness.   Mouth/Throat: Oropharynx is clear and moist.   Neck: No thyromegaly present.   Cardiovascular: Normal rate and regular rhythm. Exam reveals no friction rub.   No murmur heard.  Pulmonary/Chest: Effort normal. No stridor. No respiratory distress. She has no wheezes. She has no rales.   Coarse breath sounds/rhonchi, improved with coughing   Abdominal: Soft. Bowel sounds are normal. There is no tenderness.   Musculoskeletal: She exhibits no edema.   Lymphadenopathy:     She has no cervical adenopathy.   Neurological: She is alert.   Skin: Skin is warm and dry. No rash noted.   Psychiatric: She has a normal mood and affect.   Vitals reviewed.      Assessment:       1. Bronchitis        Plan:         Sandra was seen today for cough and headache.    Diagnoses and all orders for this visit:    Bronchitis  -     promethazine-dextromethorphan (PROMETHAZINE-DM) 6.25-15 mg/5 mL Syrp; Take 5 mLs by mouth nightly as needed.  -     benzonatate (TESSALON) 100 MG capsule; Take 1 " capsule (100 mg total) by mouth 3 (three) times daily as needed for Cough.  - Start OTC plain mucin  - Increase hydration  -Notify clinic if symptoms worsen or fail to improve      Renetta Robles PA-C

## 2020-02-26 NOTE — PATIENT INSTRUCTIONS
Acute Bronchitis  Your healthcare provider has told you that you have acute bronchitis. Bronchitis is infection or inflammation of the bronchial tubes (airways in the lungs). Normally, air moves easily in and out of the airways. Bronchitis narrows the airways, making it harder for air to flow in and out of the lungs. This causes symptoms such as shortness of breath, coughing up yellow or green mucus, and wheezing. Bronchitis can be acute or chronic. Acute means the condition comes on quickly and goes away in a short time, usually within 3 to 10 days. Chronic means a condition lasts a long time and often comes back.    What causes acute bronchitis?  Acute bronchitis almost always starts as a viral respiratory infection, such as a cold or the flu. Certain factors make it more likely for a cold or flu to turn into bronchitis. These include being very young, being elderly, having a heart or lung problem, or having a weak immune system. Cigarette smoking also makes bronchitis more likely.  When bronchitis develops, the airways become swollen. The airways may also become infected with bacteria. This is known as a secondary infection.  Diagnosing acute bronchitis  Your healthcare provider will examine you and ask about your symptoms and health history. You may also have a sputum culture to test the fluid in your lungs. Chest X-rays may be done to look for infection in the lungs.  Treating acute bronchitis  Bronchitis usually clears up as the cold or flu goes away. You can help feel better faster by doing the following:  · Take medicine as directed. You may be told to take ibuprofen or other over-the-counter medicines. These help relieve inflammation in your bronchial tubes. Your healthcare provider may prescribe an inhaler to help open up the bronchial tubes. Most of the time, acute bronchitis is caused by a viral infection. Antibiotics are usually not prescribed for viral infections.  · Drink plenty of fluids, such as  water, juice, or warm soup. Fluids loosen mucus so that you can cough it up. This helps you breathe more easily. Fluids also prevent dehydration.  · Make sure you get plenty of rest.  · Do not smoke. Do not allow anyone else to smoke in your home.  Recovery and follow-up  Follow up with your doctor as you are told. You will likely feel better in a week or two. But a dry cough can linger beyond that time. Let your doctor know if you still have symptoms (other than a dry cough) after 2 weeks, or if youre prone to getting bronchial infections. Take steps to protect yourself from future infections. These steps include stopping smoking and avoiding tobacco smoke, washing your hands often, and getting a yearly flu shot.  When to call your healthcare provider  Call the healthcare provider if you have any of the following:  · Fever of 100.4°F (38.0°C) or higher, or as advised  · Symptoms that get worse, or new symptoms  · Trouble breathing  · Symptoms that dont start to improve within a week, or within 3 days of taking antibiotics   Date Last Reviewed: 12/1/2016  © 1569-5353 [employer]. [address] [place] [address]. All rights reserved. This information is not intended as a substitute for professional medical care. Always follow your healthcare professional's instructions.        Viral Upper Respiratory Illness (Adult)  You have a viral upper respiratory illness (URI), which is another term for the common cold. This illness is contagious during the first few days. It is spread through the air by coughing and sneezing. It may also be spread by direct contact (touching the sick person and then touching your own eyes, nose, or mouth). Frequent handwashing will decrease risk of spread. Most viral illnesses go away within 7 to 10 days with rest and simple home remedies. Sometimes the illness may last for several weeks. Antibiotics will not kill a virus, and they are generally not prescribed for this  condition.    Home care  · If symptoms are severe, rest at home for the first 2 to 3 days. When you resume activity, don't let yourself get too tired.  · Avoid being exposed to cigarette smoke (yours or others).  · You may use acetaminophen or ibuprofen to control pain and fever, unless another medicine was prescribed. (Note: If you have chronic liver or kidney disease, have ever had a stomach ulcer or gastrointestinal bleeding, or are taking blood-thinning medicines, talk with your healthcare provider before using these medicines.) Aspirin should never be given to anyone under 18 years of age who is ill with a viral infection or fever. It may cause severe liver or brain damage.  · Your appetite may be poor, so a light diet is fine. Avoid dehydration by drinking 6 to 8 glasses of fluids per day (water, soft drinks, juices, tea, or soup). Extra fluids will help loosen secretions in the nose and lungs.  · Over-the-counter cold medicines will not shorten the length of time youre sick, but they may be helpful for the following symptoms: cough, sore throat, and nasal and sinus congestion. (Note: Do not use decongestants if you have high blood pressure.)  Follow-up care  Follow up with your healthcare provider, or as advised.  When to seek medical advice  Call your healthcare provider right away if any of these occur:  · Cough with lots of colored sputum (mucus)  · Severe headache; face, neck, or ear pain  · Difficulty swallowing due to throat pain  · Fever of 100.4°F (38°C)  Call 911, or get immediate medical care  Call emergency services right away if any of these occur:  · Chest pain, shortness of breath, wheezing, or difficulty breathing  · Coughing up blood  · Inability to swallow due to throat pain  Date Last Reviewed: 9/13/2015  © 0847-6960 [employer]. [address] [place] [address], [place] [address]. All rights reserved. This information is not intended as a substitute for professional medical care.  Always follow your healthcare professional's instructions.

## 2020-05-04 ENCOUNTER — PATIENT MESSAGE (OUTPATIENT)
Dept: INTERNAL MEDICINE | Facility: CLINIC | Age: 60
End: 2020-05-04

## 2020-05-04 DIAGNOSIS — R05.9 COUGH: Primary | ICD-10-CM

## 2020-05-04 DIAGNOSIS — R50.9 FEVER, UNSPECIFIED FEVER CAUSE: ICD-10-CM

## 2020-05-04 NOTE — TELEPHONE ENCOUNTER
Replied to patient asking if she would like the IgG test at [id number]. Awaiting reply. [facility]: COVID-19 antibody serology testing (Test Code 30350)

## 2020-05-12 ENCOUNTER — PATIENT MESSAGE (OUTPATIENT)
Dept: INTERNAL MEDICINE | Facility: CLINIC | Age: 60
End: 2020-05-12

## 2020-05-12 DIAGNOSIS — Z20.822 EXPOSURE TO COVID-19 VIRUS: Primary | ICD-10-CM

## 2020-05-12 NOTE — TELEPHONE ENCOUNTER
Antibody testing ordered. Will be filed through insurance. She can also try [email or web address] to see about free testing.

## 2020-05-18 ENCOUNTER — PATIENT MESSAGE (OUTPATIENT)
Dept: INTERNAL MEDICINE | Facility: CLINIC | Age: 60
End: 2020-05-18

## 2020-05-19 ENCOUNTER — PATIENT MESSAGE (OUTPATIENT)
Dept: INTERNAL MEDICINE | Facility: CLINIC | Age: 60
End: 2020-05-19

## 2020-05-20 ENCOUNTER — LAB VISIT (OUTPATIENT)
Dept: LAB | Facility: HOSPITAL | Age: 60
End: 2020-05-20
Attending: INTERNAL MEDICINE
Payer: COMMERCIAL

## 2020-05-20 DIAGNOSIS — Z20.822 EXPOSURE TO COVID-19 VIRUS: ICD-10-CM

## 2020-05-20 LAB — SARS-COV-2 IGG SERPLBLD QL IA.RAPID: NEGATIVE

## 2020-05-20 PROCEDURE — 36415 COLL VENOUS BLD VENIPUNCTURE: CPT

## 2020-05-20 PROCEDURE — 86769 SARS-COV-2 COVID-19 ANTIBODY: CPT

## 2020-09-22 ENCOUNTER — PATIENT MESSAGE (OUTPATIENT)
Dept: INTERNAL MEDICINE | Facility: CLINIC | Age: 60
End: 2020-09-22

## 2020-10-23 DIAGNOSIS — Z12.31 OTHER SCREENING MAMMOGRAM: ICD-10-CM

## 2020-11-24 ENCOUNTER — PATIENT MESSAGE (OUTPATIENT)
Dept: INTERNAL MEDICINE | Facility: CLINIC | Age: 60
End: 2020-11-24

## 2020-11-24 ENCOUNTER — CLINICAL SUPPORT (OUTPATIENT)
Dept: URGENT CARE | Facility: CLINIC | Age: 60
End: 2020-11-24
Payer: COMMERCIAL

## 2020-11-24 VITALS — TEMPERATURE: 98 F

## 2020-11-24 DIAGNOSIS — Z20.822 EXPOSURE TO COVID-19 VIRUS: Primary | ICD-10-CM

## 2020-11-24 LAB
CTP QC/QA: YES
SARS-COV-2 RDRP RESP QL NAA+PROBE: NEGATIVE

## 2020-11-24 PROCEDURE — U0002: ICD-10-PCS | Mod: QW,S$GLB,, | Performed by: NURSE PRACTITIONER

## 2020-11-24 PROCEDURE — U0002 COVID-19 LAB TEST NON-CDC: HCPCS | Mod: QW,S$GLB,, | Performed by: NURSE PRACTITIONER

## 2021-01-05 ENCOUNTER — PATIENT MESSAGE (OUTPATIENT)
Dept: OPTOMETRY | Facility: CLINIC | Age: 61
End: 2021-01-05

## 2021-02-22 ENCOUNTER — TELEPHONE (OUTPATIENT)
Dept: OPTOMETRY | Facility: CLINIC | Age: 61
End: 2021-02-22

## 2021-03-11 ENCOUNTER — HOSPITAL ENCOUNTER (EMERGENCY)
Facility: HOSPITAL | Age: 61
Discharge: HOME | End: 2021-03-11
Attending: EMERGENCY MEDICINE
Payer: COMMERCIAL

## 2021-03-11 VITALS
RESPIRATION RATE: 18 BRPM | DIASTOLIC BLOOD PRESSURE: 79 MMHG | SYSTOLIC BLOOD PRESSURE: 140 MMHG | OXYGEN SATURATION: 99 % | HEIGHT: 70 IN | HEART RATE: 67 BPM | WEIGHT: 172 LBS | TEMPERATURE: 98 F | BODY MASS INDEX: 24.62 KG/M2

## 2021-03-11 DIAGNOSIS — S61.112A LACERATION OF LEFT THUMB WITHOUT FOREIGN BODY WITH DAMAGE TO NAIL, INITIAL ENCOUNTER: Primary | ICD-10-CM

## 2021-03-11 PROBLEM — S61.012A LACERATION OF LEFT THUMB: Status: ACTIVE | Noted: 2021-03-11

## 2021-03-11 PROCEDURE — 96374 THER/PROPH/DIAG INJ IV PUSH: CPT | Mod: 59

## 2021-03-11 PROCEDURE — 99284 PR EMERGENCY DEPT VISIT,LEVEL IV: ICD-10-PCS | Mod: 25,,, | Performed by: EMERGENCY MEDICINE

## 2021-03-11 PROCEDURE — 99283 EMERGENCY DEPT VISIT LOW MDM: CPT | Mod: 25

## 2021-03-11 PROCEDURE — 99284 EMERGENCY DEPT VISIT MOD MDM: CPT | Mod: 25

## 2021-03-11 PROCEDURE — 12001 RPR S/N/AX/GEN/TRNK 2.5CM/<: CPT | Mod: ,,, | Performed by: EMERGENCY MEDICINE

## 2021-03-11 PROCEDURE — 12001 RPR S/N/AX/GEN/TRNK 2.5CM/<: CPT

## 2021-03-11 PROCEDURE — 99284 EMERGENCY DEPT VISIT MOD MDM: CPT | Mod: 25,,, | Performed by: EMERGENCY MEDICINE

## 2021-03-11 PROCEDURE — 12001 PR RESUPERF WND BODY <2.5CM: ICD-10-PCS | Mod: ,,, | Performed by: EMERGENCY MEDICINE

## 2021-03-11 PROCEDURE — 63600175 PHARM REV CODE 636 W HCPCS: Performed by: EMERGENCY MEDICINE

## 2021-03-11 PROCEDURE — 25000003 PHARM REV CODE 250: Performed by: EMERGENCY MEDICINE

## 2021-03-11 RX ORDER — LIDOCAINE HYDROCHLORIDE 10 MG/ML
20 INJECTION, SOLUTION EPIDURAL; INFILTRATION; INTRACAUDAL; PERINEURAL
Status: COMPLETED | OUTPATIENT
Start: 2021-03-11 | End: 2021-03-11

## 2021-03-11 RX ORDER — SULFAMETHOXAZOLE AND TRIMETHOPRIM 800; 160 MG/1; MG/1
1 TABLET ORAL 2 TIMES DAILY
Qty: 14 TABLET | Refills: 0 | Status: SHIPPED | OUTPATIENT
Start: 2021-03-11 | End: 2021-03-18 | Stop reason: SDUPTHER

## 2021-03-11 RX ORDER — CEFAZOLIN SODIUM 1 G/3ML
2 INJECTION, POWDER, FOR SOLUTION INTRAMUSCULAR; INTRAVENOUS
Status: COMPLETED | OUTPATIENT
Start: 2021-03-11 | End: 2021-03-11

## 2021-03-11 RX ADMIN — LIDOCAINE HYDROCHLORIDE 200 MG: 10 INJECTION, SOLUTION EPIDURAL; INFILTRATION; INTRACAUDAL at 07:03

## 2021-03-11 RX ADMIN — CEFAZOLIN 2 G: 1 INJECTION, POWDER, FOR SOLUTION INTRAMUSCULAR; INTRAVENOUS at 09:03

## 2021-03-12 ENCOUNTER — TELEPHONE (OUTPATIENT)
Dept: EMERGENCY MEDICINE | Facility: HOSPITAL | Age: 61
End: 2021-03-12

## 2021-03-17 ENCOUNTER — PATIENT OUTREACH (OUTPATIENT)
Dept: ADMINISTRATIVE | Facility: OTHER | Age: 61
End: 2021-03-17

## 2021-03-18 ENCOUNTER — OFFICE VISIT (OUTPATIENT)
Dept: ORTHOPEDICS | Facility: CLINIC | Age: 61
End: 2021-03-18
Payer: COMMERCIAL

## 2021-03-18 VITALS
WEIGHT: 171.94 LBS | BODY MASS INDEX: 24.61 KG/M2 | HEIGHT: 70 IN | HEART RATE: 66 BPM | DIASTOLIC BLOOD PRESSURE: 77 MMHG | SYSTOLIC BLOOD PRESSURE: 125 MMHG

## 2021-03-18 DIAGNOSIS — S61.012A LACERATION OF LEFT THUMB WITHOUT FOREIGN BODY WITHOUT DAMAGE TO NAIL, INITIAL ENCOUNTER: Primary | ICD-10-CM

## 2021-03-18 PROCEDURE — 3078F PR MOST RECENT DIASTOLIC BLOOD PRESSURE < 80 MM HG: ICD-10-PCS | Mod: CPTII,S$GLB,, | Performed by: PHYSICIAN ASSISTANT

## 2021-03-18 PROCEDURE — 1126F PR PAIN SEVERITY QUANTIFIED, NO PAIN PRESENT: ICD-10-PCS | Mod: S$GLB,,, | Performed by: PHYSICIAN ASSISTANT

## 2021-03-18 PROCEDURE — 3078F DIAST BP <80 MM HG: CPT | Mod: CPTII,S$GLB,, | Performed by: PHYSICIAN ASSISTANT

## 2021-03-18 PROCEDURE — 1126F AMNT PAIN NOTED NONE PRSNT: CPT | Mod: S$GLB,,, | Performed by: PHYSICIAN ASSISTANT

## 2021-03-18 PROCEDURE — 99204 PR OFFICE/OUTPT VISIT, NEW, LEVL IV, 45-59 MIN: ICD-10-PCS | Mod: S$GLB,,, | Performed by: PHYSICIAN ASSISTANT

## 2021-03-18 PROCEDURE — 99204 OFFICE O/P NEW MOD 45 MIN: CPT | Mod: S$GLB,,, | Performed by: PHYSICIAN ASSISTANT

## 2021-03-18 PROCEDURE — 99999 PR PBB SHADOW E&M-EST. PATIENT-LVL III: CPT | Mod: PBBFAC,,, | Performed by: PHYSICIAN ASSISTANT

## 2021-03-18 PROCEDURE — 3074F SYST BP LT 130 MM HG: CPT | Mod: CPTII,S$GLB,, | Performed by: PHYSICIAN ASSISTANT

## 2021-03-18 PROCEDURE — 3074F PR MOST RECENT SYSTOLIC BLOOD PRESSURE < 130 MM HG: ICD-10-PCS | Mod: CPTII,S$GLB,, | Performed by: PHYSICIAN ASSISTANT

## 2021-03-18 PROCEDURE — 99999 PR PBB SHADOW E&M-EST. PATIENT-LVL III: ICD-10-PCS | Mod: PBBFAC,,, | Performed by: PHYSICIAN ASSISTANT

## 2021-03-18 PROCEDURE — 3008F BODY MASS INDEX DOCD: CPT | Mod: CPTII,S$GLB,, | Performed by: PHYSICIAN ASSISTANT

## 2021-03-18 PROCEDURE — 3008F PR BODY MASS INDEX (BMI) DOCUMENTED: ICD-10-PCS | Mod: CPTII,S$GLB,, | Performed by: PHYSICIAN ASSISTANT

## 2021-03-18 RX ORDER — SULFAMETHOXAZOLE AND TRIMETHOPRIM 800; 160 MG/1; MG/1
1 TABLET ORAL 2 TIMES DAILY
Qty: 14 TABLET | Refills: 0 | Status: SHIPPED | OUTPATIENT
Start: 2021-03-18 | End: 2021-03-19 | Stop reason: SDUPTHER

## 2021-03-19 DIAGNOSIS — S61.012A LACERATION OF LEFT THUMB WITHOUT FOREIGN BODY WITHOUT DAMAGE TO NAIL, INITIAL ENCOUNTER: Primary | ICD-10-CM

## 2021-03-19 RX ORDER — SULFAMETHOXAZOLE AND TRIMETHOPRIM 800; 160 MG/1; MG/1
1 TABLET ORAL 2 TIMES DAILY
Qty: 14 TABLET | Refills: 0 | Status: SHIPPED | OUTPATIENT
Start: 2021-03-19 | End: 2021-03-25 | Stop reason: SDDI

## 2021-03-25 ENCOUNTER — OFFICE VISIT (OUTPATIENT)
Dept: ORTHOPEDICS | Facility: CLINIC | Age: 61
End: 2021-03-25
Payer: COMMERCIAL

## 2021-03-25 VITALS
BODY MASS INDEX: 24.61 KG/M2 | HEIGHT: 70 IN | WEIGHT: 171.94 LBS | SYSTOLIC BLOOD PRESSURE: 111 MMHG | DIASTOLIC BLOOD PRESSURE: 73 MMHG | HEART RATE: 137 BPM

## 2021-03-25 DIAGNOSIS — S61.112A LACERATION OF LEFT THUMB WITHOUT FOREIGN BODY WITH DAMAGE TO NAIL, INITIAL ENCOUNTER: ICD-10-CM

## 2021-03-25 PROCEDURE — 1126F PR PAIN SEVERITY QUANTIFIED, NO PAIN PRESENT: ICD-10-PCS | Mod: S$GLB,,, | Performed by: PHYSICIAN ASSISTANT

## 2021-03-25 PROCEDURE — 99999 PR PBB SHADOW E&M-EST. PATIENT-LVL III: ICD-10-PCS | Mod: PBBFAC,,, | Performed by: PHYSICIAN ASSISTANT

## 2021-03-25 PROCEDURE — 99213 PR OFFICE/OUTPT VISIT, EST, LEVL III, 20-29 MIN: ICD-10-PCS | Mod: S$GLB,,, | Performed by: PHYSICIAN ASSISTANT

## 2021-03-25 PROCEDURE — 3008F BODY MASS INDEX DOCD: CPT | Mod: CPTII,S$GLB,, | Performed by: PHYSICIAN ASSISTANT

## 2021-03-25 PROCEDURE — 3078F PR MOST RECENT DIASTOLIC BLOOD PRESSURE < 80 MM HG: ICD-10-PCS | Mod: CPTII,S$GLB,, | Performed by: PHYSICIAN ASSISTANT

## 2021-03-25 PROCEDURE — 99999 PR PBB SHADOW E&M-EST. PATIENT-LVL III: CPT | Mod: PBBFAC,,, | Performed by: PHYSICIAN ASSISTANT

## 2021-03-25 PROCEDURE — 3074F SYST BP LT 130 MM HG: CPT | Mod: CPTII,S$GLB,, | Performed by: PHYSICIAN ASSISTANT

## 2021-03-25 PROCEDURE — 3074F PR MOST RECENT SYSTOLIC BLOOD PRESSURE < 130 MM HG: ICD-10-PCS | Mod: CPTII,S$GLB,, | Performed by: PHYSICIAN ASSISTANT

## 2021-03-25 PROCEDURE — 1126F AMNT PAIN NOTED NONE PRSNT: CPT | Mod: S$GLB,,, | Performed by: PHYSICIAN ASSISTANT

## 2021-03-25 PROCEDURE — 3078F DIAST BP <80 MM HG: CPT | Mod: CPTII,S$GLB,, | Performed by: PHYSICIAN ASSISTANT

## 2021-03-25 PROCEDURE — 3008F PR BODY MASS INDEX (BMI) DOCUMENTED: ICD-10-PCS | Mod: CPTII,S$GLB,, | Performed by: PHYSICIAN ASSISTANT

## 2021-03-25 PROCEDURE — 99213 OFFICE O/P EST LOW 20 MIN: CPT | Mod: S$GLB,,, | Performed by: PHYSICIAN ASSISTANT

## 2021-04-05 ENCOUNTER — PATIENT MESSAGE (OUTPATIENT)
Dept: ADMINISTRATIVE | Facility: HOSPITAL | Age: 61
End: 2021-04-05

## 2021-05-17 ENCOUNTER — OFFICE VISIT (OUTPATIENT)
Dept: OPTOMETRY | Facility: CLINIC | Age: 61
End: 2021-05-17
Payer: COMMERCIAL

## 2021-05-17 DIAGNOSIS — Z83.518 FAMILY HISTORY OF MACULAR DEGENERATION: ICD-10-CM

## 2021-05-17 DIAGNOSIS — H25.13 NS (NUCLEAR SCLEROSIS), BILATERAL: Primary | ICD-10-CM

## 2021-05-17 DIAGNOSIS — I10 ESSENTIAL HYPERTENSION: ICD-10-CM

## 2021-05-17 DIAGNOSIS — Z83.511 FAMILY HISTORY OF GLAUCOMA IN BROTHER: ICD-10-CM

## 2021-05-17 DIAGNOSIS — H52.4 PRESBYOPIA: ICD-10-CM

## 2021-05-17 DIAGNOSIS — H52.13 MYOPIA, BILATERAL: ICD-10-CM

## 2021-05-17 PROCEDURE — 99999 PR PBB SHADOW E&M-EST. PATIENT-LVL II: CPT | Mod: PBBFAC,,, | Performed by: OPTOMETRIST

## 2021-05-17 PROCEDURE — 92014 PR EYE EXAM, EST PATIENT,COMPREHESV: ICD-10-PCS | Mod: S$GLB,,, | Performed by: OPTOMETRIST

## 2021-05-17 PROCEDURE — 92015 PR REFRACTION: ICD-10-PCS | Mod: S$GLB,,, | Performed by: OPTOMETRIST

## 2021-05-17 PROCEDURE — 1126F AMNT PAIN NOTED NONE PRSNT: CPT | Mod: S$GLB,,, | Performed by: OPTOMETRIST

## 2021-05-17 PROCEDURE — 92015 DETERMINE REFRACTIVE STATE: CPT | Mod: S$GLB,,, | Performed by: OPTOMETRIST

## 2021-05-17 PROCEDURE — 1126F PR PAIN SEVERITY QUANTIFIED, NO PAIN PRESENT: ICD-10-PCS | Mod: S$GLB,,, | Performed by: OPTOMETRIST

## 2021-05-17 PROCEDURE — 92014 COMPRE OPH EXAM EST PT 1/>: CPT | Mod: S$GLB,,, | Performed by: OPTOMETRIST

## 2021-05-17 PROCEDURE — 99999 PR PBB SHADOW E&M-EST. PATIENT-LVL II: ICD-10-PCS | Mod: PBBFAC,,, | Performed by: OPTOMETRIST

## 2021-07-16 ENCOUNTER — PATIENT OUTREACH (OUTPATIENT)
Dept: ADMINISTRATIVE | Facility: HOSPITAL | Age: 61
End: 2021-07-16

## 2021-07-16 ENCOUNTER — PATIENT MESSAGE (OUTPATIENT)
Dept: ADMINISTRATIVE | Facility: HOSPITAL | Age: 61
End: 2021-07-16

## 2021-08-02 ENCOUNTER — OFFICE VISIT (OUTPATIENT)
Dept: OBSTETRICS AND GYNECOLOGY | Facility: CLINIC | Age: 61
End: 2021-08-02
Attending: OBSTETRICS & GYNECOLOGY
Payer: COMMERCIAL

## 2021-08-02 VITALS
DIASTOLIC BLOOD PRESSURE: 76 MMHG | WEIGHT: 178.81 LBS | HEIGHT: 70 IN | BODY MASS INDEX: 25.6 KG/M2 | SYSTOLIC BLOOD PRESSURE: 145 MMHG

## 2021-08-02 DIAGNOSIS — N95.2 POSTMENOPAUSAL ATROPHIC VAGINITIS: ICD-10-CM

## 2021-08-02 DIAGNOSIS — N76.0 VULVOVAGINITIS: ICD-10-CM

## 2021-08-02 DIAGNOSIS — Z01.419 ENCOUNTER FOR GYNECOLOGICAL EXAMINATION WITHOUT ABNORMAL FINDING: Primary | ICD-10-CM

## 2021-08-02 DIAGNOSIS — Z12.31 SCREENING MAMMOGRAM, ENCOUNTER FOR: ICD-10-CM

## 2021-08-02 PROCEDURE — 1159F PR MEDICATION LIST DOCUMENTED IN MEDICAL RECORD: ICD-10-PCS | Mod: CPTII,S$GLB,, | Performed by: OBSTETRICS & GYNECOLOGY

## 2021-08-02 PROCEDURE — 3077F PR MOST RECENT SYSTOLIC BLOOD PRESSURE >= 140 MM HG: ICD-10-PCS | Mod: CPTII,S$GLB,, | Performed by: OBSTETRICS & GYNECOLOGY

## 2021-08-02 PROCEDURE — 1159F MED LIST DOCD IN RCRD: CPT | Mod: CPTII,S$GLB,, | Performed by: OBSTETRICS & GYNECOLOGY

## 2021-08-02 PROCEDURE — 3077F SYST BP >= 140 MM HG: CPT | Mod: CPTII,S$GLB,, | Performed by: OBSTETRICS & GYNECOLOGY

## 2021-08-02 PROCEDURE — 3078F DIAST BP <80 MM HG: CPT | Mod: CPTII,S$GLB,, | Performed by: OBSTETRICS & GYNECOLOGY

## 2021-08-02 PROCEDURE — 3078F PR MOST RECENT DIASTOLIC BLOOD PRESSURE < 80 MM HG: ICD-10-PCS | Mod: CPTII,S$GLB,, | Performed by: OBSTETRICS & GYNECOLOGY

## 2021-08-02 PROCEDURE — 1160F RVW MEDS BY RX/DR IN RCRD: CPT | Mod: CPTII,S$GLB,, | Performed by: OBSTETRICS & GYNECOLOGY

## 2021-08-02 PROCEDURE — 1126F AMNT PAIN NOTED NONE PRSNT: CPT | Mod: CPTII,S$GLB,, | Performed by: OBSTETRICS & GYNECOLOGY

## 2021-08-02 PROCEDURE — 3008F BODY MASS INDEX DOCD: CPT | Mod: CPTII,S$GLB,, | Performed by: OBSTETRICS & GYNECOLOGY

## 2021-08-02 PROCEDURE — 3008F PR BODY MASS INDEX (BMI) DOCUMENTED: ICD-10-PCS | Mod: CPTII,S$GLB,, | Performed by: OBSTETRICS & GYNECOLOGY

## 2021-08-02 PROCEDURE — 1126F PR PAIN SEVERITY QUANTIFIED, NO PAIN PRESENT: ICD-10-PCS | Mod: CPTII,S$GLB,, | Performed by: OBSTETRICS & GYNECOLOGY

## 2021-08-02 PROCEDURE — 99396 PREV VISIT EST AGE 40-64: CPT | Mod: S$GLB,,, | Performed by: OBSTETRICS & GYNECOLOGY

## 2021-08-02 PROCEDURE — 1160F PR REVIEW ALL MEDS BY PRESCRIBER/CLIN PHARMACIST DOCUMENTED: ICD-10-PCS | Mod: CPTII,S$GLB,, | Performed by: OBSTETRICS & GYNECOLOGY

## 2021-08-02 PROCEDURE — 99396 PR PREVENTIVE VISIT,EST,40-64: ICD-10-PCS | Mod: S$GLB,,, | Performed by: OBSTETRICS & GYNECOLOGY

## 2021-08-02 RX ORDER — ESTRADIOL 0.1 MG/G
1 CREAM VAGINAL
Qty: 42 G | Refills: 4 | Status: SHIPPED | OUTPATIENT
Start: 2021-08-02 | End: 2022-08-18 | Stop reason: SDUPTHER

## 2021-08-02 RX ORDER — CLOTRIMAZOLE AND BETAMETHASONE DIPROPIONATE 10; .64 MG/G; MG/G
CREAM TOPICAL
Qty: 15 G | Refills: 1 | Status: SHIPPED | OUTPATIENT
Start: 2021-08-02 | End: 2022-08-02

## 2021-08-05 ENCOUNTER — HOSPITAL ENCOUNTER (OUTPATIENT)
Dept: RADIOLOGY | Facility: HOSPITAL | Age: 61
Discharge: HOME | End: 2021-08-05
Attending: OBSTETRICS & GYNECOLOGY
Payer: COMMERCIAL

## 2021-08-05 DIAGNOSIS — Z12.31 SCREENING MAMMOGRAM, ENCOUNTER FOR: ICD-10-CM

## 2021-08-05 PROCEDURE — 77067 MAMMO DIGITAL SCREENING BILAT WITH TOMO: ICD-10-PCS | Mod: 26,,, | Performed by: RADIOLOGY

## 2021-08-05 PROCEDURE — 77063 BREAST TOMOSYNTHESIS BI: CPT | Mod: 26,,, | Performed by: RADIOLOGY

## 2021-08-05 PROCEDURE — 77063 MAMMO DIGITAL SCREENING BILAT WITH TOMO: ICD-10-PCS | Mod: 26,,, | Performed by: RADIOLOGY

## 2021-08-05 PROCEDURE — 77067 SCR MAMMO BI INCL CAD: CPT | Mod: TC

## 2021-08-05 PROCEDURE — 77067 SCR MAMMO BI INCL CAD: CPT | Mod: 26,,, | Performed by: RADIOLOGY

## 2021-11-29 ENCOUNTER — PATIENT MESSAGE (OUTPATIENT)
Dept: INTERNAL MEDICINE | Facility: CLINIC | Age: 61
End: 2021-11-29
Payer: COMMERCIAL

## 2021-12-02 ENCOUNTER — IMMUNIZATION (OUTPATIENT)
Dept: INTERNAL MEDICINE | Facility: CLINIC | Age: 61
End: 2021-12-02
Payer: COMMERCIAL

## 2021-12-02 ENCOUNTER — PATIENT MESSAGE (OUTPATIENT)
Dept: INTERNAL MEDICINE | Facility: CLINIC | Age: 61
End: 2021-12-02

## 2021-12-02 ENCOUNTER — OFFICE VISIT (OUTPATIENT)
Dept: INTERNAL MEDICINE | Facility: CLINIC | Age: 61
End: 2021-12-02
Payer: COMMERCIAL

## 2021-12-02 ENCOUNTER — LAB VISIT (OUTPATIENT)
Dept: LAB | Facility: OTHER | Age: 61
End: 2021-12-02
Attending: INTERNAL MEDICINE
Payer: COMMERCIAL

## 2021-12-02 VITALS
BODY MASS INDEX: 26.04 KG/M2 | HEART RATE: 64 BPM | OXYGEN SATURATION: 98 % | DIASTOLIC BLOOD PRESSURE: 72 MMHG | HEIGHT: 70 IN | WEIGHT: 181.88 LBS | SYSTOLIC BLOOD PRESSURE: 108 MMHG

## 2021-12-02 DIAGNOSIS — Z00.00 WELLNESS EXAMINATION: Primary | ICD-10-CM

## 2021-12-02 DIAGNOSIS — Z23 NEED FOR VACCINATION: Primary | ICD-10-CM

## 2021-12-02 DIAGNOSIS — E78.2 MIXED HYPERLIPIDEMIA: ICD-10-CM

## 2021-12-02 DIAGNOSIS — Z00.00 WELLNESS EXAMINATION: ICD-10-CM

## 2021-12-02 DIAGNOSIS — Z51.81 MEDICATION MONITORING ENCOUNTER: Primary | ICD-10-CM

## 2021-12-02 LAB
ALBUMIN SERPL BCP-MCNC: 4.7 G/DL (ref 3.5–5.2)
ALP SERPL-CCNC: 76 U/L (ref 55–135)
ALT SERPL W/O P-5'-P-CCNC: 20 U/L (ref 10–44)
ANION GAP SERPL CALC-SCNC: 11 MMOL/L (ref 8–16)
AST SERPL-CCNC: 21 U/L (ref 10–40)
BASOPHILS # BLD AUTO: 0.14 K/UL (ref 0–0.2)
BASOPHILS NFR BLD: 2.4 % (ref 0–1.9)
BILIRUB SERPL-MCNC: 0.4 MG/DL (ref 0.1–1)
BUN SERPL-MCNC: 13 MG/DL (ref 6–20)
CALCIUM SERPL-MCNC: 10.3 MG/DL (ref 8.7–10.5)
CHLORIDE SERPL-SCNC: 102 MMOL/L (ref 95–110)
CHOLEST SERPL-MCNC: 308 MG/DL (ref 120–199)
CHOLEST/HDLC SERPL: 3.5 {RATIO} (ref 2–5)
CO2 SERPL-SCNC: 27 MMOL/L (ref 23–29)
CREAT SERPL-MCNC: 0.9 MG/DL (ref 0.5–1.4)
DIFFERENTIAL METHOD: ABNORMAL
EOSINOPHIL # BLD AUTO: 0.4 K/UL (ref 0–0.5)
EOSINOPHIL NFR BLD: 6.6 % (ref 0–8)
ERYTHROCYTE [DISTWIDTH] IN BLOOD BY AUTOMATED COUNT: 12.9 % (ref 11.5–14.5)
EST. GFR  (AFRICAN AMERICAN): >60 ML/MIN/1.73 M^2
EST. GFR  (NON AFRICAN AMERICAN): >60 ML/MIN/1.73 M^2
GLUCOSE SERPL-MCNC: 98 MG/DL (ref 70–110)
HCT VFR BLD AUTO: 44.2 % (ref 37–48.5)
HDLC SERPL-MCNC: 87 MG/DL (ref 40–75)
HDLC SERPL: 28.2 % (ref 20–50)
HGB BLD-MCNC: 14 G/DL (ref 12–16)
HIV 1+2 AB+HIV1 P24 AG SERPL QL IA: NEGATIVE
IMM GRANULOCYTES # BLD AUTO: 0.01 K/UL (ref 0–0.04)
IMM GRANULOCYTES NFR BLD AUTO: 0.2 % (ref 0–0.5)
LDLC SERPL CALC-MCNC: 204.2 MG/DL (ref 63–159)
LYMPHOCYTES # BLD AUTO: 2.5 K/UL (ref 1–4.8)
LYMPHOCYTES NFR BLD: 42 % (ref 18–48)
MCH RBC QN AUTO: 29.4 PG (ref 27–31)
MCHC RBC AUTO-ENTMCNC: 31.7 G/DL (ref 32–36)
MCV RBC AUTO: 93 FL (ref 82–98)
MONOCYTES # BLD AUTO: 0.5 K/UL (ref 0.3–1)
MONOCYTES NFR BLD: 8.8 % (ref 4–15)
NEUTROPHILS # BLD AUTO: 2.4 K/UL (ref 1.8–7.7)
NEUTROPHILS NFR BLD: 40 % (ref 38–73)
NONHDLC SERPL-MCNC: 221 MG/DL
NRBC BLD-RTO: 0 /100 WBC
PLATELET # BLD AUTO: 360 K/UL (ref 150–450)
PMV BLD AUTO: 9.7 FL (ref 9.2–12.9)
POTASSIUM SERPL-SCNC: 5 MMOL/L (ref 3.5–5.1)
PROT SERPL-MCNC: 7.7 G/DL (ref 6–8.4)
RBC # BLD AUTO: 4.76 M/UL (ref 4–5.4)
SODIUM SERPL-SCNC: 140 MMOL/L (ref 136–145)
TRIGL SERPL-MCNC: 84 MG/DL (ref 30–150)
WBC # BLD AUTO: 5.88 K/UL (ref 3.9–12.7)

## 2021-12-02 PROCEDURE — 99396 PR PREVENTIVE VISIT,EST,40-64: ICD-10-PCS | Mod: S$GLB,,, | Performed by: INTERNAL MEDICINE

## 2021-12-02 PROCEDURE — 85025 COMPLETE CBC W/AUTO DIFF WBC: CPT | Performed by: INTERNAL MEDICINE

## 2021-12-02 PROCEDURE — 99999 PR PBB SHADOW E&M-EST. PATIENT-LVL III: CPT | Mod: PBBFAC,,, | Performed by: INTERNAL MEDICINE

## 2021-12-02 PROCEDURE — 99999 PR PBB SHADOW E&M-EST. PATIENT-LVL III: ICD-10-PCS | Mod: PBBFAC,,, | Performed by: INTERNAL MEDICINE

## 2021-12-02 PROCEDURE — 87389 HIV-1 AG W/HIV-1&-2 AB AG IA: CPT | Performed by: INTERNAL MEDICINE

## 2021-12-02 PROCEDURE — 80053 COMPREHEN METABOLIC PANEL: CPT | Performed by: INTERNAL MEDICINE

## 2021-12-02 PROCEDURE — 80061 LIPID PANEL: CPT | Performed by: INTERNAL MEDICINE

## 2021-12-02 PROCEDURE — 0064A COVID-19, MRNA, LNP-S, PF, 100 MCG/0.25 ML DOSE VACCINE (MODERNA BOOSTER): CPT | Mod: PBBFAC | Performed by: INTERNAL MEDICINE

## 2021-12-02 PROCEDURE — 99396 PREV VISIT EST AGE 40-64: CPT | Mod: S$GLB,,, | Performed by: INTERNAL MEDICINE

## 2021-12-02 PROCEDURE — 36415 COLL VENOUS BLD VENIPUNCTURE: CPT | Performed by: INTERNAL MEDICINE

## 2021-12-02 RX ORDER — ATORVASTATIN CALCIUM 40 MG/1
40 TABLET, FILM COATED ORAL DAILY
Qty: 90 TABLET | Refills: 3 | Status: SHIPPED | OUTPATIENT
Start: 2021-12-02 | End: 2022-11-11

## 2021-12-03 ENCOUNTER — IMMUNIZATION (OUTPATIENT)
Dept: PHARMACY | Facility: CLINIC | Age: 61
End: 2021-12-03
Payer: COMMERCIAL

## 2021-12-03 ENCOUNTER — PATIENT MESSAGE (OUTPATIENT)
Dept: INTERNAL MEDICINE | Facility: CLINIC | Age: 61
End: 2021-12-03
Payer: COMMERCIAL

## 2022-01-12 ENCOUNTER — PATIENT MESSAGE (OUTPATIENT)
Dept: OPTOMETRY | Facility: CLINIC | Age: 62
End: 2022-01-12
Payer: COMMERCIAL

## 2022-01-27 ENCOUNTER — PATIENT MESSAGE (OUTPATIENT)
Dept: OPTOMETRY | Facility: CLINIC | Age: 62
End: 2022-01-27
Payer: COMMERCIAL

## 2022-01-28 ENCOUNTER — LAB VISIT (OUTPATIENT)
Dept: LAB | Facility: HOSPITAL | Age: 62
End: 2022-01-28
Attending: INTERNAL MEDICINE
Payer: COMMERCIAL

## 2022-01-28 DIAGNOSIS — Z51.81 MEDICATION MONITORING ENCOUNTER: ICD-10-CM

## 2022-01-28 DIAGNOSIS — E78.2 MIXED HYPERLIPIDEMIA: ICD-10-CM

## 2022-01-28 LAB
ALBUMIN SERPL BCP-MCNC: 4.2 G/DL (ref 3.5–5.2)
ALP SERPL-CCNC: 134 U/L (ref 55–135)
ALT SERPL W/O P-5'-P-CCNC: 34 U/L (ref 10–44)
AST SERPL-CCNC: 24 U/L (ref 10–40)
BILIRUB DIRECT SERPL-MCNC: 0.2 MG/DL (ref 0.1–0.3)
BILIRUB SERPL-MCNC: 0.7 MG/DL (ref 0.1–1)
CHOLEST SERPL-MCNC: 181 MG/DL (ref 120–199)
CHOLEST/HDLC SERPL: 2.6 {RATIO} (ref 2–5)
HDLC SERPL-MCNC: 69 MG/DL (ref 40–75)
HDLC SERPL: 38.1 % (ref 20–50)
LDLC SERPL CALC-MCNC: 95.6 MG/DL (ref 63–159)
NONHDLC SERPL-MCNC: 112 MG/DL
PROT SERPL-MCNC: 7.1 G/DL (ref 6–8.4)
TRIGL SERPL-MCNC: 82 MG/DL (ref 30–150)

## 2022-01-28 PROCEDURE — 80061 LIPID PANEL: CPT | Performed by: INTERNAL MEDICINE

## 2022-01-28 PROCEDURE — 80076 HEPATIC FUNCTION PANEL: CPT | Performed by: INTERNAL MEDICINE

## 2022-01-28 PROCEDURE — 36415 COLL VENOUS BLD VENIPUNCTURE: CPT | Performed by: INTERNAL MEDICINE

## 2022-01-31 ENCOUNTER — PATIENT MESSAGE (OUTPATIENT)
Dept: INTERNAL MEDICINE | Facility: CLINIC | Age: 62
End: 2022-01-31
Payer: COMMERCIAL

## 2022-06-15 ENCOUNTER — PATIENT MESSAGE (OUTPATIENT)
Dept: INTERNAL MEDICINE | Facility: CLINIC | Age: 62
End: 2022-06-15
Payer: COMMERCIAL

## 2022-08-10 ENCOUNTER — OFFICE VISIT (OUTPATIENT)
Dept: INTERNAL MEDICINE | Facility: CLINIC | Age: 62
End: 2022-08-10
Payer: COMMERCIAL

## 2022-08-10 ENCOUNTER — TELEPHONE (OUTPATIENT)
Dept: INTERNAL MEDICINE | Facility: CLINIC | Age: 62
End: 2022-08-10
Payer: COMMERCIAL

## 2022-08-10 DIAGNOSIS — J01.80 ACUTE NON-RECURRENT SINUSITIS OF OTHER SINUS: Primary | ICD-10-CM

## 2022-08-10 DIAGNOSIS — Z12.31 OTHER SCREENING MAMMOGRAM: ICD-10-CM

## 2022-08-10 PROCEDURE — 1159F PR MEDICATION LIST DOCUMENTED IN MEDICAL RECORD: ICD-10-PCS | Mod: CPTII,95,, | Performed by: NURSE PRACTITIONER

## 2022-08-10 PROCEDURE — 1160F RVW MEDS BY RX/DR IN RCRD: CPT | Mod: CPTII,95,, | Performed by: NURSE PRACTITIONER

## 2022-08-10 PROCEDURE — 99214 PR OFFICE/OUTPT VISIT, EST, LEVL IV, 30-39 MIN: ICD-10-PCS | Mod: 95,,, | Performed by: NURSE PRACTITIONER

## 2022-08-10 PROCEDURE — 1160F PR REVIEW ALL MEDS BY PRESCRIBER/CLIN PHARMACIST DOCUMENTED: ICD-10-PCS | Mod: CPTII,95,, | Performed by: NURSE PRACTITIONER

## 2022-08-10 PROCEDURE — 1159F MED LIST DOCD IN RCRD: CPT | Mod: CPTII,95,, | Performed by: NURSE PRACTITIONER

## 2022-08-10 PROCEDURE — 99214 OFFICE O/P EST MOD 30 MIN: CPT | Mod: 95,,, | Performed by: NURSE PRACTITIONER

## 2022-08-10 RX ORDER — COVID-19 ANTIGEN TEST
KIT MISCELLANEOUS
COMMUNITY
Start: 2022-08-02 | End: 2023-03-02

## 2022-08-10 RX ORDER — AZELASTINE 1 MG/ML
1 SPRAY, METERED NASAL 2 TIMES DAILY
Qty: 30 ML | Refills: 0 | Status: SHIPPED | OUTPATIENT
Start: 2022-08-10 | End: 2022-10-20 | Stop reason: ALTCHOICE

## 2022-08-10 NOTE — TELEPHONE ENCOUNTER
----- Message from Trupti Alonzo sent at 8/10/2022  2:32 PM CDT -----  Regarding: medication request  Name of Who is Calling: Meagan           What is the request in detail: Patient is requesting a call back to get medication called in for sinus infection.            Can the clinic reply by [facility]: No           What Number to Call Back if not in [facility]: [phone]

## 2022-08-10 NOTE — TELEPHONE ENCOUNTER
Called pt  Spoke to her  She described her sinus infection as piling up and draining in massive quantities    Scheduled same day virtual  Pt verbalized understanding and had no further concerns or questions.

## 2022-08-10 NOTE — PROGRESS NOTES
"Subjective:       Patient ID: Sandra Mitchell is a 61 y.o. female.    Chief Complaint: Sinus Problem    The patient location is: [place]  The chief complaint leading to consultation is: sinus issues    Visit type: audiovisual    Face to Face time with patient: 15 minutes  20 minutes of total time spent on the encounter, which includes face to face time and non-face to face time preparing to see the patient (eg, review of tests), Obtaining and/or reviewing separately obtained history, Documenting clinical information in the electronic or other health record, Independently interpreting results (not separately reported) and communicating results to the patient/family/caregiver, or Care coordination (not separately reported).         Each patient to whom he or she provides medical services by telemedicine is:  (1) informed of the relationship between the physician and patient and the respective role of any other health care provider with respect to management of the patient; and (2) notified that he or she may decline to receive medical services by telemedicine and may withdraw from such care at any time.    Notes: Pt of Dr. Valdez, here virtually for sinus issues. Started 8-9 days ago. Recently flew on a plane yesterday. Tested negative for COVID a few time since this started. See ROS below.  Messaged PCP today and MA documented before scheduling her with me, "She described her sinus infection as piling up and draining in massive quantities."      Sinus Problem  This is a recurrent problem. The current episode started 1 to 4 weeks ago (8-9 days ago). The problem has been gradually worsening since onset. There has been no fever. Her pain is at a severity of 0/10. She is experiencing no pain. Associated symptoms include congestion, coughing and sinus pressure. Pertinent negatives include no chills, diaphoresis, ear pain, headaches, hoarse voice, neck pain, shortness of breath, sneezing, sore throat or swollen " glands. (Rhinorrhea, started clear, now light green and thick per pt) Past treatments include acetaminophen and oral decongestants (pseudophed 12 hour, Ibuprofen, Tylenol). The treatment provided no relief.   Cough  This is a recurrent problem. The current episode started in the past 7 days. The problem has been waxing and waning. The problem occurs every few hours. The cough is non-productive (dry hacking cough). Associated symptoms include nasal congestion, postnasal drip and rhinorrhea. Pertinent negatives include no chest pain, chills, ear congestion, ear pain, fever, headaches, heartburn, hemoptysis, myalgias, rash, sore throat, shortness of breath, sweats, weight loss or wheezing. Associated symptoms comments: Clear rhinorrhea initially, now light green and thick. The symptoms are aggravated by animals. Risk factors for lung disease include animal exposure (sleeps with ceiling fan, 2 dogs and 1 cat that shed in home). Treatments tried: pseudophed 12 hour, Ibuprofen, Tylenol. The treatment provided no relief. Her past medical history is significant for environmental allergies. There is no history of asthma, bronchiectasis, bronchitis, COPD, emphysema or pneumonia.     Review of Systems   Constitutional: Negative for chills, diaphoresis, fever and weight loss.   HENT: Positive for nasal congestion, postnasal drip, rhinorrhea and sinus pressure/congestion. Negative for ear pain, hoarse voice, sneezing and sore throat.    Respiratory: Positive for cough. Negative for hemoptysis, chest tightness, shortness of breath and wheezing.    Cardiovascular: Negative for chest pain.   Gastrointestinal: Negative for abdominal pain, constipation, diarrhea, heartburn, nausea and vomiting.   Musculoskeletal: Negative for myalgias and neck pain.   Integumentary:  Negative for rash.   Allergic/Immunologic: Positive for environmental allergies. Negative for food allergies and immunocompromised state.        Sleeps with ceiling fan,  2 dogs and 1 cat that shed in home    Recently flew on a plane   Neurological: Negative for dizziness, weakness, light-headedness, numbness and headaches.   Hematological: Negative for adenopathy. Does not bruise/bleed easily.   Psychiatric/Behavioral: Negative for suicidal ideas.        Review of patient's allergies indicates:   Allergen Reactions    Aspirin Hives     Other reaction(s): Hives       Current Outpatient Medications:     atorvastatin (LIPITOR) 40 MG tablet, Take 1 tablet (40 mg total) by mouth once daily., Disp: 90 tablet, Rfl: 3    FLOWFLEX COVID-19 AG HOME TEST Kit, , Disp: , Rfl:     SF 5000 PLUS 1.1 % Crea, , Disp: , Rfl: 0    estradioL (ESTRACE) 0.01 % (0.1 mg/gram) vaginal cream, Place 1 g vaginally twice a week., Disp: 42 g, Rfl: 4    Patient Active Problem List   Diagnosis    Lumbar radiculopathy    Laceration of left thumb     No past medical history on file.    Past Surgical History:   Procedure Laterality Date    APPENDECTOMY      BREAST CYST ASPIRATION      BREAST CYST INCISION AND DRAINAGE      DILATION AND CURETTAGE OF UTERUS      TUBAL LIGATION      Bilateral salpingectomy     Social History     Socioeconomic History    Marital status:    Tobacco Use    Smoking status: Never Smoker    Smokeless tobacco: Never Used   Substance and Sexual Activity    Alcohol use: Yes     Alcohol/week: 4.0 standard drinks     Types: 4 Glasses of wine per week     Comment: per week    Drug use: No    Sexual activity: Yes     Partners: Male     Birth control/protection: Post-menopausal     Comment:  since 2014     Social Determinants of Health     Financial Resource Strain: Low Risk     Difficulty of Paying Living Expenses: Not hard at all   Food Insecurity: No Food Insecurity    Worried About Running Out of Food in the Last Year: Never true    Ran Out of Food in the Last Year: Never true   Transportation Needs: No Transportation Needs    Lack of Transportation (Medical):  No    Lack of Transportation (Non-Medical): No   Physical Activity: Sufficiently Active    Days of Exercise per Week: 4 days    Minutes of Exercise per Session: 60 min   Stress: No Stress Concern Present    Feeling of Stress : Not at all   Social Connections: Unknown    Frequency of Communication with Friends and Family: More than three times a week    Frequency of Social Gatherings with Friends and Family: More than three times a week    Active Member of Clubs or Organizations: No    Attends Club or Organization Meetings: Never    Marital Status:    Housing Stability: Unknown    Unable to Pay for Housing in the Last Year: No    Unstable Housing in the Last Year: No     Family History   Problem Relation Age of Onset    Cataracts Mother     Dementia Mother     Alzheimer's disease Mother     Macular degeneration Father     Heart disease Father         CABG    Diabetes Father     Hypertension Father     Breast cancer Paternal Aunt     Amblyopia Neg Hx     Blindness Neg Hx     Glaucoma Neg Hx     Retinal detachment Neg Hx     Strabismus Neg Hx     Colon cancer Neg Hx     Ovarian cancer Neg Hx     Stroke Neg Hx            Objective:      Physical Exam      Limited PE, seen virtually, no distress during video visit    Assessment:       Problem List Items Addressed This Visit    None     Visit Diagnoses     Acute non-recurrent sinusitis of other sinus    -  Primary    Relevant Medications    azelastine (ASTELIN) 137 mcg (0.1 %) nasal spray          Plan:       Sandra was seen today for sinus problem.    Diagnoses and all orders for this visit:    Acute non-recurrent sinusitis of other sinus  -     azelastine (ASTELIN) 137 mcg (0.1 %) nasal spray; 1 spray (137 mcg total) by Nasal route 2 (two) times daily.    Meds as prescribed    Rest and Fluids, Tylenol or Motrin otc as needed for pain and or fever    Warm liquids to thin mucus    Allergen avoidance discussed, humidified air  recommended    Warm salt water gargles as needed for throat pain    Nasal spray, taught how to correctly use    If no improvement or symptoms worsen by Friday please message me    Self care instructions provided in AVS    Follow up if symptoms worsen or fail to improve.

## 2022-08-10 NOTE — PATIENT INSTRUCTIONS
Sandra was seen today for sinus problem.    Diagnoses and all orders for this visit:    Acute non-recurrent sinusitis of other sinus  -     azelastine (ASTELIN) 137 mcg (0.1 %) nasal spray; 1 spray (137 mcg total) by Nasal route 2 (two) times daily.    Meds as prescribed    Rest and Fluids, Tylenol or Motrin otc as needed for pain and or fever    Warm liquids to thin mucus    Allergen avoidance discussed, humidified air recommended    Warm salt water gargles as needed for throat pain    Nasal spray, taught how to correctly use    If no improvement or symptoms worsen by Friday please message me

## 2022-08-18 ENCOUNTER — PATIENT MESSAGE (OUTPATIENT)
Dept: OBSTETRICS AND GYNECOLOGY | Facility: CLINIC | Age: 62
End: 2022-08-18
Payer: COMMERCIAL

## 2022-08-18 DIAGNOSIS — N95.2 POSTMENOPAUSAL ATROPHIC VAGINITIS: ICD-10-CM

## 2022-08-18 RX ORDER — ESTRADIOL 0.1 MG/G
1 CREAM VAGINAL
Qty: 42 G | Refills: 0 | Status: SHIPPED | OUTPATIENT
Start: 2022-08-18 | End: 2022-10-20 | Stop reason: SDUPTHER

## 2022-10-20 ENCOUNTER — OFFICE VISIT (OUTPATIENT)
Dept: OBSTETRICS AND GYNECOLOGY | Facility: CLINIC | Age: 62
End: 2022-10-20
Attending: OBSTETRICS & GYNECOLOGY
Payer: COMMERCIAL

## 2022-10-20 VITALS
HEIGHT: 70 IN | DIASTOLIC BLOOD PRESSURE: 72 MMHG | WEIGHT: 180.19 LBS | BODY MASS INDEX: 25.8 KG/M2 | SYSTOLIC BLOOD PRESSURE: 115 MMHG

## 2022-10-20 DIAGNOSIS — N76.0 VULVOVAGINITIS: ICD-10-CM

## 2022-10-20 DIAGNOSIS — N95.2 POSTMENOPAUSAL ATROPHIC VAGINITIS: ICD-10-CM

## 2022-10-20 DIAGNOSIS — Z12.4 PAP SMEAR FOR CERVICAL CANCER SCREENING: ICD-10-CM

## 2022-10-20 DIAGNOSIS — Z12.31 SCREENING MAMMOGRAM, ENCOUNTER FOR: ICD-10-CM

## 2022-10-20 DIAGNOSIS — Z01.419 ENCOUNTER FOR GYNECOLOGICAL EXAMINATION WITHOUT ABNORMAL FINDING: Primary | ICD-10-CM

## 2022-10-20 PROCEDURE — 1159F MED LIST DOCD IN RCRD: CPT | Mod: CPTII,S$GLB,, | Performed by: OBSTETRICS & GYNECOLOGY

## 2022-10-20 PROCEDURE — 87624 HPV HI-RISK TYP POOLED RSLT: CPT | Performed by: OBSTETRICS & GYNECOLOGY

## 2022-10-20 PROCEDURE — 1160F RVW MEDS BY RX/DR IN RCRD: CPT | Mod: CPTII,S$GLB,, | Performed by: OBSTETRICS & GYNECOLOGY

## 2022-10-20 PROCEDURE — 99999 PR PBB SHADOW E&M-EST. PATIENT-LVL III: ICD-10-PCS | Mod: PBBFAC,,, | Performed by: OBSTETRICS & GYNECOLOGY

## 2022-10-20 PROCEDURE — 3078F DIAST BP <80 MM HG: CPT | Mod: CPTII,S$GLB,, | Performed by: OBSTETRICS & GYNECOLOGY

## 2022-10-20 PROCEDURE — 3074F SYST BP LT 130 MM HG: CPT | Mod: CPTII,S$GLB,, | Performed by: OBSTETRICS & GYNECOLOGY

## 2022-10-20 PROCEDURE — 99999 PR PBB SHADOW E&M-EST. PATIENT-LVL III: CPT | Mod: PBBFAC,,, | Performed by: OBSTETRICS & GYNECOLOGY

## 2022-10-20 PROCEDURE — 99396 PREV VISIT EST AGE 40-64: CPT | Mod: S$GLB,,, | Performed by: OBSTETRICS & GYNECOLOGY

## 2022-10-20 PROCEDURE — 88175 CYTOPATH C/V AUTO FLUID REDO: CPT | Performed by: OBSTETRICS & GYNECOLOGY

## 2022-10-20 PROCEDURE — 1160F PR REVIEW ALL MEDS BY PRESCRIBER/CLIN PHARMACIST DOCUMENTED: ICD-10-PCS | Mod: CPTII,S$GLB,, | Performed by: OBSTETRICS & GYNECOLOGY

## 2022-10-20 PROCEDURE — 1159F PR MEDICATION LIST DOCUMENTED IN MEDICAL RECORD: ICD-10-PCS | Mod: CPTII,S$GLB,, | Performed by: OBSTETRICS & GYNECOLOGY

## 2022-10-20 PROCEDURE — 99396 PR PREVENTIVE VISIT,EST,40-64: ICD-10-PCS | Mod: S$GLB,,, | Performed by: OBSTETRICS & GYNECOLOGY

## 2022-10-20 PROCEDURE — 3074F PR MOST RECENT SYSTOLIC BLOOD PRESSURE < 130 MM HG: ICD-10-PCS | Mod: CPTII,S$GLB,, | Performed by: OBSTETRICS & GYNECOLOGY

## 2022-10-20 PROCEDURE — 3078F PR MOST RECENT DIASTOLIC BLOOD PRESSURE < 80 MM HG: ICD-10-PCS | Mod: CPTII,S$GLB,, | Performed by: OBSTETRICS & GYNECOLOGY

## 2022-10-20 RX ORDER — CLOTRIMAZOLE AND BETAMETHASONE DIPROPIONATE 10; .64 MG/G; MG/G
CREAM TOPICAL
Qty: 15 G | Refills: 3 | Status: SHIPPED | OUTPATIENT
Start: 2022-10-20 | End: 2023-10-30 | Stop reason: SDUPTHER

## 2022-10-20 RX ORDER — ESTRADIOL 0.1 MG/G
1 CREAM VAGINAL EVERY OTHER DAY
Qty: 42 G | Refills: 4 | Status: SHIPPED | OUTPATIENT
Start: 2022-10-20 | End: 2023-10-30 | Stop reason: SDUPTHER

## 2022-10-20 NOTE — PROGRESS NOTES
Subjective:       Patient ID: Sandra Mitchell is a 61 y.o. female.    Chief Complaint:  Well Woman      History of Present Illness  HPI    Sandra Mitchell is a 61 y.o. female  here for her annual GYN exam.  She has had good relief of vaginal dryness with use of the vaginal estradiol cream.  She is menopausal since age 47, and is not on HRT.  denies break through bleeding.   denies vaginal itching or irritation.  Denies vaginal discharge.  She is sexually active. She has had 1partner for 9 years .     History of abnormal pap: No  Last Pap: approximate date  and was normal and negative for HR HPV  Last MMG: normal-2021-routine follow-up in 12 months  Last Colonoscopy:  colonoscopy several years ago without abnormalities.  denies domestic violence. She does feel safe at home.     History reviewed. No pertinent past medical history.  Past Surgical History:   Procedure Laterality Date    APPENDECTOMY      BREAST CYST ASPIRATION      BREAST CYST INCISION AND DRAINAGE      DILATION AND CURETTAGE OF UTERUS      TUBAL LIGATION      Bilateral salpingectomy     Social History     Socioeconomic History    Marital status:    Tobacco Use    Smoking status: Never    Smokeless tobacco: Never   Substance and Sexual Activity    Alcohol use: Yes     Alcohol/week: 4.0 standard drinks     Types: 4 Glasses of wine per week     Comment: per week    Drug use: No    Sexual activity: Yes     Partners: Male     Birth control/protection: Post-menopausal     Comment:  since      Social Determinants of Health     Financial Resource Strain: Low Risk     Difficulty of Paying Living Expenses: Not hard at all   Food Insecurity: No Food Insecurity    Worried About Running Out of Food in the Last Year: Never true    Ran Out of Food in the Last Year: Never true   Transportation Needs: No Transportation Needs    Lack of Transportation (Medical): No    Lack of Transportation (Non-Medical): No  "  Physical Activity: Sufficiently Active    Days of Exercise per Week: 4 days    Minutes of Exercise per Session: 60 min   Stress: No Stress Concern Present    Feeling of Stress : Not at all   Social Connections: Unknown    Frequency of Communication with Friends and Family: More than three times a week    Frequency of Social Gatherings with Friends and Family: More than three times a week    Active Member of Clubs or Organizations: No    Attends Club or Organization Meetings: Never    Marital Status:    Housing Stability: Unknown    Unable to Pay for Housing in the Last Year: No    Unstable Housing in the Last Year: No     Family History   Problem Relation Age of Onset    Cataracts Mother     Dementia Mother     Alzheimer's disease Mother     Macular degeneration Father     Heart disease Father         CABG    Diabetes Father     Hypertension Father     Breast cancer Paternal Aunt     Amblyopia Neg Hx     Blindness Neg Hx     Glaucoma Neg Hx     Retinal detachment Neg Hx     Strabismus Neg Hx     Colon cancer Neg Hx     Ovarian cancer Neg Hx     Stroke Neg Hx      OB History          0    Para   0    Term   0       0    AB   0    Living   0         SAB   0    IAB   0    Ectopic   0    Multiple   0    Live Births   0                 /72   Ht 5' 10" (1.778 m)   Wt 81.7 kg (180 lb 3.2 oz)   LMP 2007   BMI 25.86 kg/m²         GYN & OB History  Patient's last menstrual period was 2007.   Date of Last Pap: No result found    OB History    Para Term  AB Living   0 0 0 0 0 0   SAB IAB Ectopic Multiple Live Births   0 0 0 0 0       Review of Systems  Review of Systems   Constitutional:  Negative for activity change, appetite change, fatigue and unexpected weight change.   HENT: Negative.     Eyes:  Negative for visual disturbance.   Respiratory:  Negative for shortness of breath and wheezing.    Cardiovascular:  Negative for chest pain, palpitations and leg swelling. "   Gastrointestinal:  Negative for abdominal pain, bloating and blood in stool.   Endocrine: Negative for diabetes and hair loss.   Genitourinary:  Negative for decreased libido, dyspareunia, hot flashes and vaginal dryness.   Musculoskeletal:  Negative for back pain and joint swelling.   Integumentary:  Negative for acne, hair changes and nipple discharge.   Neurological:  Negative for headaches.   Hematological:  Does not bruise/bleed easily.   Psychiatric/Behavioral:  Negative for depression and sleep disturbance. The patient is not nervous/anxious.    Breast: Negative for mastodynia and nipple discharge        Objective:      Physical Exam:   Constitutional: She is oriented to person, place, and time. She appears well-developed and well-nourished.    HENT:   Head: Normocephalic and atraumatic.    Eyes: Pupils are equal, round, and reactive to light. EOM are normal.     Cardiovascular:  Normal rate and regular rhythm.             Pulmonary/Chest: Effort normal and breath sounds normal.   BREASTS:  no mass, no tenderness, no deformity and no retraction. Right breast exhibits no inverted nipple, no mass, no nipple discharge, no skin change, no tenderness, no bleeding and no swelling. Left breast exhibits no inverted nipple, no mass, no nipple discharge, no skin change, no tenderness, no bleeding and no swelling. Breasts are symmetrical.              Abdominal: Soft. Bowel sounds are normal.     Genitourinary:    Pelvic exam was performed with patient supine.      Genitourinary Comments: PELVIC: Normal external genitalia without lesions.  Normal hair distribution.  Adequate perineal body, normal urethral meatus.  Vagina  Moist and moderately well rugated, without lesions or discharge.  Cervix pink, without lesions, discharge or tenderness.  No significant cystocele or rectocele.  Bimanual exam shows uterus to be normal size, regular, mobile and nontender.  Adnexa without masses or tenderness.    RECTAL:Deferred                  Musculoskeletal: Normal range of motion and moves all extremeties.       Neurological: She is alert and oriented to person, place, and time.    Skin: Skin is warm and dry.    Psychiatric: She has a normal mood and affect.            Assessment:        1. Encounter for gynecological examination without abnormal finding    2. Screening mammogram, encounter for    3. Pap smear for cervical cancer screening    4. Postmenopausal atrophic vaginitis    5. Vulvovaginitis                Plan:        1. Encounter for gynecological examination without abnormal finding  COUNSELING:  The patient was counseled today on regular weight bearing exercise. Patient was counseled today on the new ACS guidelines for cervical cytology screening as well as the current recommendations for breast cancer screening. Counseling session lasted approximately 10 minutes, and all her questions were answered. She was advised to see her primary care physician for all other health maintenance.   FOLLOW-UP with me for next routine visit.       2. Screening mammogram, encounter for      - Mammo Digital Screening Bilat w/ [facility]; Future    3. Pap smear for cervical cancer screening      - HPV High Risk Genotypes, PCR  - Liquid-Based Pap Smear, Screening    4. Postmenopausal atrophic vaginitis      - estradioL (ESTRACE) 0.01 % (0.1 mg/gram) vaginal cream; Place 1 g vaginally every other day.  Dispense: 42 g; Refill: 4    5. Vulvovaginitis      - clotrimazole-betamethasone 1-0.05% (LOTRISONE) cream; Apply to affected area 2 times daily  Dispense: 15 g; Refill: 3       Follow up in about 1 year (around 10/20/2023).

## 2022-10-26 LAB
FINAL PATHOLOGIC DIAGNOSIS: NORMAL
HPV HR 12 DNA SPEC QL NAA+PROBE: NEGATIVE
HPV16 AG SPEC QL: NEGATIVE
HPV18 DNA SPEC QL NAA+PROBE: NEGATIVE
Lab: NORMAL

## 2022-10-28 ENCOUNTER — OFFICE VISIT (OUTPATIENT)
Dept: OPTOMETRY | Facility: CLINIC | Age: 62
End: 2022-10-28
Payer: COMMERCIAL

## 2022-10-28 DIAGNOSIS — H52.4 PRESBYOPIA: ICD-10-CM

## 2022-10-28 DIAGNOSIS — I10 ESSENTIAL HYPERTENSION: ICD-10-CM

## 2022-10-28 DIAGNOSIS — H52.13 MYOPIA, BILATERAL: ICD-10-CM

## 2022-10-28 DIAGNOSIS — H25.13 NS (NUCLEAR SCLEROSIS), BILATERAL: Primary | ICD-10-CM

## 2022-10-28 DIAGNOSIS — Z83.518 FAMILY HISTORY OF MACULAR DEGENERATION: ICD-10-CM

## 2022-10-28 DIAGNOSIS — Z83.511 FAMILY HISTORY OF GLAUCOMA IN BROTHER: ICD-10-CM

## 2022-10-28 PROCEDURE — 92014 COMPRE OPH EXAM EST PT 1/>: CPT | Mod: S$GLB,,, | Performed by: OPTOMETRIST

## 2022-10-28 PROCEDURE — 99999 PR PBB SHADOW E&M-EST. PATIENT-LVL II: CPT | Mod: PBBFAC,,, | Performed by: OPTOMETRIST

## 2022-10-28 PROCEDURE — 99999 PR PBB SHADOW E&M-EST. PATIENT-LVL II: ICD-10-PCS | Mod: PBBFAC,,, | Performed by: OPTOMETRIST

## 2022-10-28 PROCEDURE — 92015 PR REFRACTION: ICD-10-PCS | Mod: S$GLB,,, | Performed by: OPTOMETRIST

## 2022-10-28 PROCEDURE — 1159F MED LIST DOCD IN RCRD: CPT | Mod: CPTII,S$GLB,, | Performed by: OPTOMETRIST

## 2022-10-28 PROCEDURE — 92015 DETERMINE REFRACTIVE STATE: CPT | Mod: S$GLB,,, | Performed by: OPTOMETRIST

## 2022-10-28 PROCEDURE — 1159F PR MEDICATION LIST DOCUMENTED IN MEDICAL RECORD: ICD-10-PCS | Mod: CPTII,S$GLB,, | Performed by: OPTOMETRIST

## 2022-10-28 PROCEDURE — 92014 PR EYE EXAM, EST PATIENT,COMPREHESV: ICD-10-PCS | Mod: S$GLB,,, | Performed by: OPTOMETRIST

## 2022-10-28 NOTE — PROGRESS NOTES
HPI    Pt is here today for a Routine eye exam.   She states that her vision has been stable since her last exam.    No eye meds  Last edited by Allen Mehta on 10/28/2022  9:05 AM.            Assessment /Plan     For exam results, see Encounter Report.             Family history of macular degeneration: father     Essential hypertension              No retinopathy, monitor yearly     NS (nuclear sclerosis), bilateral               Mild, monitor     Myopia, bilateral  Presbyopia               Rx specs, pt prefers Lined bifocal and separate Computer/ reading Rx                Family history of glaucoma in brother               IOP and optic nerve appearance WNL OU today               Monitor yearly      RTC 1 year, sooner PRN

## 2022-11-11 RX ORDER — ATORVASTATIN CALCIUM 40 MG/1
40 TABLET, FILM COATED ORAL DAILY
Qty: 90 TABLET | Refills: 0 | Status: SHIPPED | OUTPATIENT
Start: 2022-11-11 | End: 2023-03-02 | Stop reason: SDUPTHER

## 2022-11-11 NOTE — TELEPHONE ENCOUNTER
Care Due:                  Date            Visit Type   Department     Provider  --------------------------------------------------------------------------------                                EP -                              PRIMARY      [facility] INTERNAL  Last Visit: 12-      [facility] ([facility])   MEDICINE       Kota Valdez  Next Visit: None Scheduled  None         None Found                                                            Last  Test          Frequency    Reason                     Performed    Due Date  --------------------------------------------------------------------------------    Office Visit  12 months..  atorvastatin.............  12- 11-    CMP.........  12 months..  atorvastatin.............  12- 01-    Lipid Panel.  12 months..  atorvastatin.............  01- 01-    [facility] Embedded Care Gaps. Reference number: [id number]. 11/11/2022   11:36:18 AM CST

## 2022-11-11 NOTE — TELEPHONE ENCOUNTER
Refill Decision Note   Sandra Mitchell  is requesting a refill authorization.  Brief Assessment and Rationale for Refill:  Approve    -Medication-Related Problems Identified:   Requires appointment  Requires labs  Medication Therapy Plan:  Due for annual with PCP, Labs (CMP, Lipid panel)    Medication Reconciliation Completed: No   Comments:     Provider Staff:     Action is required for this patient.   Please see care gap opportunities below in Care Due Message.     Thanks!  [facility]     Appointments      Date Provider   Last Visit   12/2/2021 Kota Valdez MD   Next Visit   Visit date not found Kota Valdez MD     Note composed:4:24 PM 11/11/2022           Note composed:4:24 PM 11/11/2022

## 2022-12-28 ENCOUNTER — HOSPITAL ENCOUNTER (OUTPATIENT)
Dept: RADIOLOGY | Facility: HOSPITAL | Age: 62
Discharge: HOME | End: 2022-12-28
Attending: OBSTETRICS & GYNECOLOGY
Payer: COMMERCIAL

## 2022-12-28 DIAGNOSIS — Z12.31 SCREENING MAMMOGRAM, ENCOUNTER FOR: ICD-10-CM

## 2022-12-28 PROCEDURE — 77063 BREAST TOMOSYNTHESIS BI: CPT | Mod: TC

## 2022-12-28 PROCEDURE — 77063 BREAST TOMOSYNTHESIS BI: CPT | Mod: 26,,, | Performed by: RADIOLOGY

## 2022-12-28 PROCEDURE — 77063 MAMMO DIGITAL SCREENING BILAT WITH TOMO: ICD-10-PCS | Mod: 26,,, | Performed by: RADIOLOGY

## 2022-12-28 PROCEDURE — 77067 SCR MAMMO BI INCL CAD: CPT | Mod: 26,,, | Performed by: RADIOLOGY

## 2022-12-28 PROCEDURE — 77067 MAMMO DIGITAL SCREENING BILAT WITH TOMO: ICD-10-PCS | Mod: 26,,, | Performed by: RADIOLOGY

## 2023-01-12 ENCOUNTER — PATIENT MESSAGE (OUTPATIENT)
Dept: INTERNAL MEDICINE | Facility: CLINIC | Age: 63
End: 2023-01-12
Payer: COMMERCIAL

## 2023-01-12 DIAGNOSIS — Z00.00 WELLNESS EXAMINATION: Primary | ICD-10-CM

## 2023-02-15 ENCOUNTER — LAB VISIT (OUTPATIENT)
Dept: LAB | Facility: HOSPITAL | Age: 63
End: 2023-02-15
Attending: INTERNAL MEDICINE
Payer: COMMERCIAL

## 2023-02-15 DIAGNOSIS — Z00.00 WELLNESS EXAMINATION: ICD-10-CM

## 2023-02-15 LAB
ALBUMIN SERPL BCP-MCNC: 4.2 G/DL (ref 3.5–5.2)
ALP SERPL-CCNC: 76 U/L (ref 55–135)
ALT SERPL W/O P-5'-P-CCNC: 20 U/L (ref 10–44)
ANION GAP SERPL CALC-SCNC: 10 MMOL/L (ref 8–16)
AST SERPL-CCNC: 21 U/L (ref 10–40)
BASOPHILS # BLD AUTO: 0.11 K/UL (ref 0–0.2)
BASOPHILS NFR BLD: 1.8 % (ref 0–1.9)
BILIRUB SERPL-MCNC: 0.6 MG/DL (ref 0.1–1)
BUN SERPL-MCNC: 11 MG/DL (ref 6–20)
CALCIUM SERPL-MCNC: 9.8 MG/DL (ref 8.7–10.5)
CHLORIDE SERPL-SCNC: 104 MMOL/L (ref 95–110)
CHOLEST SERPL-MCNC: 191 MG/DL (ref 120–199)
CHOLEST/HDLC SERPL: 2.8 {RATIO} (ref 2–5)
CO2 SERPL-SCNC: 25 MMOL/L (ref 23–29)
CREAT SERPL-MCNC: 0.8 MG/DL (ref 0.5–1.4)
DIFFERENTIAL METHOD: ABNORMAL
EOSINOPHIL # BLD AUTO: 0.3 K/UL (ref 0–0.5)
EOSINOPHIL NFR BLD: 5.6 % (ref 0–8)
ERYTHROCYTE [DISTWIDTH] IN BLOOD BY AUTOMATED COUNT: 13.2 % (ref 11.5–14.5)
EST. GFR  (NO RACE VARIABLE): >60 ML/MIN/1.73 M^2
GLUCOSE SERPL-MCNC: 104 MG/DL (ref 70–110)
HCT VFR BLD AUTO: 40.4 % (ref 37–48.5)
HDLC SERPL-MCNC: 69 MG/DL (ref 40–75)
HDLC SERPL: 36.1 % (ref 20–50)
HGB BLD-MCNC: 12.9 G/DL (ref 12–16)
IMM GRANULOCYTES # BLD AUTO: 0.02 K/UL (ref 0–0.04)
IMM GRANULOCYTES NFR BLD AUTO: 0.3 % (ref 0–0.5)
LDLC SERPL CALC-MCNC: 105.2 MG/DL (ref 63–159)
LYMPHOCYTES # BLD AUTO: 2.1 K/UL (ref 1–4.8)
LYMPHOCYTES NFR BLD: 34 % (ref 18–48)
MCH RBC QN AUTO: 28.2 PG (ref 27–31)
MCHC RBC AUTO-ENTMCNC: 31.9 G/DL (ref 32–36)
MCV RBC AUTO: 88 FL (ref 82–98)
MONOCYTES # BLD AUTO: 0.5 K/UL (ref 0.3–1)
MONOCYTES NFR BLD: 7.9 % (ref 4–15)
NEUTROPHILS # BLD AUTO: 3.1 K/UL (ref 1.8–7.7)
NEUTROPHILS NFR BLD: 50.4 % (ref 38–73)
NONHDLC SERPL-MCNC: 122 MG/DL
NRBC BLD-RTO: 0 /100 WBC
PLATELET # BLD AUTO: 376 K/UL (ref 150–450)
PMV BLD AUTO: 9.4 FL (ref 9.2–12.9)
POTASSIUM SERPL-SCNC: 4.4 MMOL/L (ref 3.5–5.1)
PROT SERPL-MCNC: 6.9 G/DL (ref 6–8.4)
RBC # BLD AUTO: 4.57 M/UL (ref 4–5.4)
SODIUM SERPL-SCNC: 139 MMOL/L (ref 136–145)
TRIGL SERPL-MCNC: 84 MG/DL (ref 30–150)
WBC # BLD AUTO: 6.06 K/UL (ref 3.9–12.7)

## 2023-02-15 PROCEDURE — 36415 COLL VENOUS BLD VENIPUNCTURE: CPT | Performed by: INTERNAL MEDICINE

## 2023-02-15 PROCEDURE — 80053 COMPREHEN METABOLIC PANEL: CPT | Performed by: INTERNAL MEDICINE

## 2023-02-15 PROCEDURE — 80061 LIPID PANEL: CPT | Performed by: INTERNAL MEDICINE

## 2023-02-15 PROCEDURE — 85025 COMPLETE CBC W/AUTO DIFF WBC: CPT | Performed by: INTERNAL MEDICINE

## 2023-02-28 PROBLEM — E78.2 MIXED HYPERLIPIDEMIA: Status: ACTIVE | Noted: 2023-02-28

## 2023-03-01 NOTE — PROGRESS NOTES
Subjective:       Patient ID: Sandra Mitchell is a 62 y.o. female.    Chief Complaint: Annual Exam    Pt here for annual exam. Counseled on exercise goals. She is up to date with GYN for WWE and mammogram. R/b of diff screening modalities for colon ca d/w pt and she opts for cologuard.    She has osteopenia on prior DEXA 8/2019. On ca/d.     HLD is tx with lipitor which she tolerates well.         Review of Systems   Constitutional:  Negative for activity change, fatigue, fever and unexpected weight change.   HENT:  Negative for congestion, hearing loss, rhinorrhea, sore throat and trouble swallowing.    Eyes:  Negative for discharge and visual disturbance.   Respiratory:  Negative for chest tightness, shortness of breath and wheezing.    Cardiovascular:  Negative for chest pain, palpitations and leg swelling.   Gastrointestinal:  Negative for abdominal pain, blood in stool, constipation, diarrhea and vomiting.   Endocrine: Negative for polydipsia and polyuria.   Genitourinary:  Negative for difficulty urinating, dysuria, hematuria and menstrual problem.   Musculoskeletal:  Negative for arthralgias, joint swelling and neck pain.   Skin:  Negative for rash.   Neurological:  Negative for dizziness, syncope, weakness and headaches.   Hematological:  Negative for adenopathy.   Psychiatric/Behavioral:  Negative for confusion and dysphoric mood.      Objective:      Physical Exam  Constitutional:       Appearance: She is well-developed.   HENT:      Head: Normocephalic.      Right Ear: Tympanic membrane, ear canal and external ear normal.      Left Ear: Tympanic membrane, ear canal and external ear normal.      Nose: Nose normal.      Mouth/Throat:      Pharynx: Uvula midline.   Eyes:      General: Lids are normal.      Conjunctiva/sclera: Conjunctivae normal.      Right eye: Right conjunctiva is not injected.      Left eye: Left conjunctiva is not injected.      Pupils: Pupils are equal, round, and reactive to  light.   Neck:      Thyroid: No thyroid mass or thyromegaly.      Vascular: No carotid bruit or JVD.   Cardiovascular:      Rate and Rhythm: Normal rate and regular rhythm.      Chest Wall: PMI is not displaced.      Pulses:           Posterior tibial pulses are 2+ on the right side and 2+ on the left side.      Heart sounds: S1 normal and S2 normal. No murmur heard.  Pulmonary:      Effort: Pulmonary effort is normal.      Breath sounds: Normal breath sounds. No wheezing, rhonchi or rales.   Abdominal:      General: Bowel sounds are normal. There is no distension or abdominal bruit.      Palpations: Abdomen is soft.      Tenderness: There is no abdominal tenderness.   Musculoskeletal:      Cervical back: Neck supple.   Lymphadenopathy:      Head:      Right side of head: No preauricular or posterior auricular adenopathy.      Left side of head: No preauricular or posterior auricular adenopathy.      Cervical: No cervical adenopathy.   Skin:     General: Skin is warm.      Findings: No rash.   Neurological:      Mental Status: She is alert and oriented to person, place, and time.      Cranial Nerves: No cranial nerve deficit.      Sensory: No sensory deficit.      Deep Tendon Reflexes:      Reflex Scores:       Patellar reflexes are 2+ on the right side and 2+ on the left side.       Achilles reflexes are 2+ on the right side and 2+ on the left side.  Psychiatric:         Speech: Speech normal.         Behavior: Behavior normal.         Thought Content: Thought content normal.         Judgment: Judgment normal.       Assessment:       1. Wellness examination    2. Mixed hyperlipidemia    3. Encounter for colorectal cancer screening        Plan:       1. recent labs reviewed  2. cologuard

## 2023-03-02 ENCOUNTER — OFFICE VISIT (OUTPATIENT)
Dept: INTERNAL MEDICINE | Facility: CLINIC | Age: 63
End: 2023-03-02
Payer: COMMERCIAL

## 2023-03-02 VITALS
DIASTOLIC BLOOD PRESSURE: 64 MMHG | HEIGHT: 70 IN | WEIGHT: 178.69 LBS | BODY MASS INDEX: 25.58 KG/M2 | SYSTOLIC BLOOD PRESSURE: 100 MMHG | OXYGEN SATURATION: 98 % | HEART RATE: 69 BPM

## 2023-03-02 DIAGNOSIS — E78.2 MIXED HYPERLIPIDEMIA: ICD-10-CM

## 2023-03-02 DIAGNOSIS — Z12.12 ENCOUNTER FOR COLORECTAL CANCER SCREENING: ICD-10-CM

## 2023-03-02 DIAGNOSIS — Z00.00 WELLNESS EXAMINATION: Primary | ICD-10-CM

## 2023-03-02 DIAGNOSIS — Z12.11 ENCOUNTER FOR COLORECTAL CANCER SCREENING: ICD-10-CM

## 2023-03-02 PROCEDURE — 99999 PR PBB SHADOW E&M-EST. PATIENT-LVL III: ICD-10-PCS | Mod: PBBFAC,,, | Performed by: INTERNAL MEDICINE

## 2023-03-02 PROCEDURE — 1160F PR REVIEW ALL MEDS BY PRESCRIBER/CLIN PHARMACIST DOCUMENTED: ICD-10-PCS | Mod: CPTII,S$GLB,, | Performed by: INTERNAL MEDICINE

## 2023-03-02 PROCEDURE — 3008F PR BODY MASS INDEX (BMI) DOCUMENTED: ICD-10-PCS | Mod: CPTII,S$GLB,, | Performed by: INTERNAL MEDICINE

## 2023-03-02 PROCEDURE — 3078F PR MOST RECENT DIASTOLIC BLOOD PRESSURE < 80 MM HG: ICD-10-PCS | Mod: CPTII,S$GLB,, | Performed by: INTERNAL MEDICINE

## 2023-03-02 PROCEDURE — 99396 PR PREVENTIVE VISIT,EST,40-64: ICD-10-PCS | Mod: S$GLB,,, | Performed by: INTERNAL MEDICINE

## 2023-03-02 PROCEDURE — 1159F PR MEDICATION LIST DOCUMENTED IN MEDICAL RECORD: ICD-10-PCS | Mod: CPTII,S$GLB,, | Performed by: INTERNAL MEDICINE

## 2023-03-02 PROCEDURE — 99999 PR PBB SHADOW E&M-EST. PATIENT-LVL III: CPT | Mod: PBBFAC,,, | Performed by: INTERNAL MEDICINE

## 2023-03-02 PROCEDURE — 3074F PR MOST RECENT SYSTOLIC BLOOD PRESSURE < 130 MM HG: ICD-10-PCS | Mod: CPTII,S$GLB,, | Performed by: INTERNAL MEDICINE

## 2023-03-02 PROCEDURE — 1160F RVW MEDS BY RX/DR IN RCRD: CPT | Mod: CPTII,S$GLB,, | Performed by: INTERNAL MEDICINE

## 2023-03-02 PROCEDURE — 1159F MED LIST DOCD IN RCRD: CPT | Mod: CPTII,S$GLB,, | Performed by: INTERNAL MEDICINE

## 2023-03-02 PROCEDURE — 3078F DIAST BP <80 MM HG: CPT | Mod: CPTII,S$GLB,, | Performed by: INTERNAL MEDICINE

## 2023-03-02 PROCEDURE — 3008F BODY MASS INDEX DOCD: CPT | Mod: CPTII,S$GLB,, | Performed by: INTERNAL MEDICINE

## 2023-03-02 PROCEDURE — 99396 PREV VISIT EST AGE 40-64: CPT | Mod: S$GLB,,, | Performed by: INTERNAL MEDICINE

## 2023-03-02 PROCEDURE — 3074F SYST BP LT 130 MM HG: CPT | Mod: CPTII,S$GLB,, | Performed by: INTERNAL MEDICINE

## 2023-03-02 RX ORDER — ATORVASTATIN CALCIUM 40 MG/1
40 TABLET, FILM COATED ORAL DAILY
Qty: 90 TABLET | Refills: 3 | Status: SHIPPED | OUTPATIENT
Start: 2023-03-02 | End: 2023-11-27 | Stop reason: SDUPTHER

## 2023-03-19 LAB — NONINV COLON CA DNA+OCC BLD SCRN STL QL: NEGATIVE

## 2023-06-07 ENCOUNTER — TELEPHONE (OUTPATIENT)
Dept: INTERNAL MEDICINE | Facility: CLINIC | Age: 63
End: 2023-06-07
Payer: COMMERCIAL

## 2023-06-07 DIAGNOSIS — E78.2 MIXED HYPERLIPIDEMIA: Primary | ICD-10-CM

## 2023-06-07 DIAGNOSIS — Z00.01 ENCOUNTER FOR GENERAL ADULT MEDICAL EXAMINATION WITH ABNORMAL FINDINGS: ICD-10-CM

## 2023-06-07 NOTE — TELEPHONE ENCOUNTER
----- Message from Madeline Petersen sent at 6/7/2023  7:16 AM CDT -----  Regarding: ELIZABETH  GM,    Pt states she work from [place] and will be home for virtual visit. I'm not sure if she will be however. I have left an appt note to verify before visit starts.     Madeline  Provider   [phone]

## 2023-06-07 NOTE — TELEPHONE ENCOUNTER
----- Message from Madeline Petersen sent at 6/6/2023  5:14 PM CDT -----  Good evening,    Pt need labs prior to appt.

## 2023-06-07 NOTE — TELEPHONE ENCOUNTER
Future Appointments   Date Time Provider Department Center   6/26/2023 10:15 AM Kota Valdez MD [facility]   6/27/2023  2:00 PM Ligia Gracia DO [facility]   3/1/2024  7:10 AM LAB, APPOINTMENT [facility] LAB VNP Jeanine Ortiz

## 2023-06-08 ENCOUNTER — PATIENT MESSAGE (OUTPATIENT)
Dept: INTERNAL MEDICINE | Facility: CLINIC | Age: 63
End: 2023-06-08
Payer: COMMERCIAL

## 2023-06-08 NOTE — TELEPHONE ENCOUNTER
Orders Placed This Encounter    CBC Auto Differential    Comprehensive Metabolic Panel    Hemoglobin A1C    Lipid Panel    TSH     Dr. Ligia Gracia D.O.   [facility]

## 2023-06-08 NOTE — TELEPHONE ENCOUNTER
Called pt and left detailed VM  Messaged pt and selected option to be notified if not read by Monday

## 2023-06-20 NOTE — PROGRESS NOTES
Subjective     Patient ID: Sandra Mitchell is a 62 y.o. female.    Chief Complaint: Obesity    The patient location is: home  The chief complaint leading to consultation is: overweight    Visit type: audiovisual    Face to Face time with patient: 9mins  10 minutes of total time spent on the encounter, which includes face to face time and non-face to face time preparing to see the patient (eg, review of tests), Obtaining and/or reviewing separately obtained history, Documenting clinical information in the electronic or other health record, Independently interpreting results (not separately reported) and communicating results to the patient/family/caregiver, or Care coordination (not separately reported).     Each patient to whom he or she provides medical services by telemedicine is:  (1) informed of the relationship between the physician and patient and the respective role of any other health care provider with respect to management of the patient; and (2) notified that he or she may decline to receive medical services by telemedicine and may withdraw from such care at any time.    Pt is wanting to lose weight but struggling in spite of diet/exercise. Her BMI is 25.4. We discussed proper indications for medical wt loss.     Review of Systems   Constitutional:  Negative for activity change and unexpected weight change.   HENT:  Negative for hearing loss, rhinorrhea and trouble swallowing.    Eyes:  Negative for discharge and visual disturbance.   Respiratory:  Negative for chest tightness and wheezing.    Cardiovascular:  Negative for chest pain and palpitations.   Gastrointestinal:  Negative for blood in stool, constipation, diarrhea and vomiting.   Endocrine: Negative for polydipsia and polyuria.   Genitourinary:  Negative for difficulty urinating, dysuria, hematuria and menstrual problem.   Musculoskeletal:  Negative for arthralgias, joint swelling and neck pain.   Neurological:  Negative for weakness  and headaches.   Psychiatric/Behavioral:  Negative for confusion and dysphoric mood.         Objective     Physical Exam  Constitutional:       Appearance: Normal appearance. She is well-developed.   Pulmonary:      Effort: Pulmonary effort is normal. No respiratory distress.   Neurological:      Mental Status: She is alert and oriented to person, place, and time.   Psychiatric:         Mood and Affect: Mood normal.         Behavior: Behavior normal.         Thought Content: Thought content normal.         Judgment: Judgment normal.          Assessment and Plan     1. Overweight        Based on BMI and lack of risk otherwise, I do not recommend medical wt loss Rx; focus on diet/exercise         No follow-ups on file.

## 2023-06-26 ENCOUNTER — OFFICE VISIT (OUTPATIENT)
Dept: INTERNAL MEDICINE | Facility: CLINIC | Age: 63
End: 2023-06-26
Payer: COMMERCIAL

## 2023-06-26 VITALS
SYSTOLIC BLOOD PRESSURE: 120 MMHG | DIASTOLIC BLOOD PRESSURE: 76 MMHG | WEIGHT: 177 LBS | BODY MASS INDEX: 25.34 KG/M2 | HEIGHT: 70 IN

## 2023-06-26 DIAGNOSIS — E66.3 OVERWEIGHT: Primary | ICD-10-CM

## 2023-06-26 PROCEDURE — 99213 PR OFFICE/OUTPT VISIT, EST, LEVL III, 20-29 MIN: ICD-10-PCS | Mod: 95,,, | Performed by: INTERNAL MEDICINE

## 2023-06-26 PROCEDURE — 3008F PR BODY MASS INDEX (BMI) DOCUMENTED: ICD-10-PCS | Mod: CPTII,95,, | Performed by: INTERNAL MEDICINE

## 2023-06-26 PROCEDURE — 3078F PR MOST RECENT DIASTOLIC BLOOD PRESSURE < 80 MM HG: ICD-10-PCS | Mod: CPTII,95,, | Performed by: INTERNAL MEDICINE

## 2023-06-26 PROCEDURE — 3008F BODY MASS INDEX DOCD: CPT | Mod: CPTII,95,, | Performed by: INTERNAL MEDICINE

## 2023-06-26 PROCEDURE — 1159F PR MEDICATION LIST DOCUMENTED IN MEDICAL RECORD: ICD-10-PCS | Mod: CPTII,95,, | Performed by: INTERNAL MEDICINE

## 2023-06-26 PROCEDURE — 3074F PR MOST RECENT SYSTOLIC BLOOD PRESSURE < 130 MM HG: ICD-10-PCS | Mod: CPTII,95,, | Performed by: INTERNAL MEDICINE

## 2023-06-26 PROCEDURE — 3078F DIAST BP <80 MM HG: CPT | Mod: CPTII,95,, | Performed by: INTERNAL MEDICINE

## 2023-06-26 PROCEDURE — 3074F SYST BP LT 130 MM HG: CPT | Mod: CPTII,95,, | Performed by: INTERNAL MEDICINE

## 2023-06-26 PROCEDURE — 1159F MED LIST DOCD IN RCRD: CPT | Mod: CPTII,95,, | Performed by: INTERNAL MEDICINE

## 2023-06-26 PROCEDURE — 1160F PR REVIEW ALL MEDS BY PRESCRIBER/CLIN PHARMACIST DOCUMENTED: ICD-10-PCS | Mod: CPTII,95,, | Performed by: INTERNAL MEDICINE

## 2023-06-26 PROCEDURE — 1160F RVW MEDS BY RX/DR IN RCRD: CPT | Mod: CPTII,95,, | Performed by: INTERNAL MEDICINE

## 2023-06-26 PROCEDURE — 99213 OFFICE O/P EST LOW 20 MIN: CPT | Mod: 95,,, | Performed by: INTERNAL MEDICINE

## 2023-09-18 ENCOUNTER — PATIENT MESSAGE (OUTPATIENT)
Dept: OPTOMETRY | Facility: CLINIC | Age: 63
End: 2023-09-18
Payer: COMMERCIAL

## 2023-09-18 ENCOUNTER — TELEPHONE (OUTPATIENT)
Dept: OPHTHALMOLOGY | Facility: CLINIC | Age: 63
End: 2023-09-18
Payer: COMMERCIAL

## 2023-09-18 NOTE — TELEPHONE ENCOUNTER
Returned pt call, no answer.  LM for pt regarding appt scheduled with Dr Saldana on 10/23/23 at 8:20am at the [facility], [address] [place] [address]. LM for pt to call if date and time did not work for pt.

## 2023-10-09 ENCOUNTER — PATIENT MESSAGE (OUTPATIENT)
Dept: INTERNAL MEDICINE | Facility: CLINIC | Age: 63
End: 2023-10-09
Payer: COMMERCIAL

## 2023-10-30 ENCOUNTER — OFFICE VISIT (OUTPATIENT)
Dept: OBSTETRICS AND GYNECOLOGY | Facility: CLINIC | Age: 63
End: 2023-10-30
Payer: COMMERCIAL

## 2023-10-30 VITALS
BODY MASS INDEX: 23.17 KG/M2 | HEIGHT: 70 IN | WEIGHT: 161.81 LBS | SYSTOLIC BLOOD PRESSURE: 122 MMHG | DIASTOLIC BLOOD PRESSURE: 75 MMHG

## 2023-10-30 DIAGNOSIS — N95.2 POSTMENOPAUSAL ATROPHIC VAGINITIS: ICD-10-CM

## 2023-10-30 DIAGNOSIS — Z01.419 ENCOUNTER FOR GYNECOLOGICAL EXAMINATION WITHOUT ABNORMAL FINDING: Primary | ICD-10-CM

## 2023-10-30 DIAGNOSIS — N76.0 VULVOVAGINITIS: ICD-10-CM

## 2023-10-30 DIAGNOSIS — Z12.31 SCREENING MAMMOGRAM, ENCOUNTER FOR: ICD-10-CM

## 2023-10-30 PROCEDURE — 99396 PR PREVENTIVE VISIT,EST,40-64: ICD-10-PCS | Mod: S$GLB,,, | Performed by: OBSTETRICS & GYNECOLOGY

## 2023-10-30 PROCEDURE — 99999 PR PBB SHADOW E&M-EST. PATIENT-LVL III: ICD-10-PCS | Mod: PBBFAC,,, | Performed by: OBSTETRICS & GYNECOLOGY

## 2023-10-30 PROCEDURE — 99396 PREV VISIT EST AGE 40-64: CPT | Mod: S$GLB,,, | Performed by: OBSTETRICS & GYNECOLOGY

## 2023-10-30 PROCEDURE — 3078F PR MOST RECENT DIASTOLIC BLOOD PRESSURE < 80 MM HG: ICD-10-PCS | Mod: CPTII,S$GLB,, | Performed by: OBSTETRICS & GYNECOLOGY

## 2023-10-30 PROCEDURE — 3074F SYST BP LT 130 MM HG: CPT | Mod: CPTII,S$GLB,, | Performed by: OBSTETRICS & GYNECOLOGY

## 2023-10-30 PROCEDURE — 3008F BODY MASS INDEX DOCD: CPT | Mod: CPTII,S$GLB,, | Performed by: OBSTETRICS & GYNECOLOGY

## 2023-10-30 PROCEDURE — 3078F DIAST BP <80 MM HG: CPT | Mod: CPTII,S$GLB,, | Performed by: OBSTETRICS & GYNECOLOGY

## 2023-10-30 PROCEDURE — 3008F PR BODY MASS INDEX (BMI) DOCUMENTED: ICD-10-PCS | Mod: CPTII,S$GLB,, | Performed by: OBSTETRICS & GYNECOLOGY

## 2023-10-30 PROCEDURE — 3074F PR MOST RECENT SYSTOLIC BLOOD PRESSURE < 130 MM HG: ICD-10-PCS | Mod: CPTII,S$GLB,, | Performed by: OBSTETRICS & GYNECOLOGY

## 2023-10-30 PROCEDURE — 99999 PR PBB SHADOW E&M-EST. PATIENT-LVL III: CPT | Mod: PBBFAC,,, | Performed by: OBSTETRICS & GYNECOLOGY

## 2023-10-30 RX ORDER — CLOTRIMAZOLE AND BETAMETHASONE DIPROPIONATE 10; .64 MG/G; MG/G
CREAM TOPICAL
Qty: 15 G | Refills: 3 | Status: SHIPPED | OUTPATIENT
Start: 2023-10-30 | End: 2024-10-29

## 2023-10-30 RX ORDER — ESTRADIOL 0.1 MG/G
1 CREAM VAGINAL EVERY OTHER DAY
Qty: 42 G | Refills: 4 | Status: SHIPPED | OUTPATIENT
Start: 2023-10-30

## 2023-10-30 NOTE — PROGRESS NOTES
Subjective:       Patient ID: Sandra Mitchell is a 63 y.o. female.    Chief Complaint:  Annual Exam and Gynecologic Exam      History of Present Illness  Gynecologic Exam  Pertinent negatives include no abdominal pain, back pain or headaches.       Sandra Mitchell is a 63 y.o. female  here for her annual GYN exam.    She is menopausal since age 47, and is not  on HRT.  Vaginal dryness is greatly improved since using Estradiol cream on a regular basis.Occasionally gets vulvar itching which resolved quickly with lotrisone.   denies break through bleeding.   denies vaginal itching or irritation.  Denies vaginal discharge.  She is sexually active. She has had1 partner for the past 10 years .     History of abnormal pap: No  Last Pap: approximate date 10- and was normal  Last MMG: normal-2022: BI-RADS Category 1: Negative-routine follow-up in 12 months  Last Colonoscopy:  Cologuard normal  denies domestic violence. She does feel safe at home.     History reviewed. No pertinent past medical history.  Past Surgical History:   Procedure Laterality Date    APPENDECTOMY      BREAST CYST ASPIRATION      BREAST CYST INCISION AND DRAINAGE      DILATION AND CURETTAGE OF UTERUS      TUBAL LIGATION      Bilateral salpingectomy     Social History     Socioeconomic History    Marital status:    Tobacco Use    Smoking status: Never    Smokeless tobacco: Never   Substance and Sexual Activity    Alcohol use: Yes     Alcohol/week: 4.0 standard drinks of alcohol     Types: 4 Glasses of wine per week     Comment: per week    Drug use: No    Sexual activity: Yes     Partners: Male     Birth control/protection: Post-menopausal     Comment:  since      Social Determinants of Health     Financial Resource Strain: Low Risk  (10/29/2023)    Overall Financial Resource Strain (CARDIA)     Difficulty of Paying Living Expenses: Not hard at all   Food Insecurity: No Food Insecurity  (10/29/2023)    Hunger Vital Sign     Worried About Running Out of Food in the Last Year: Never true     Ran Out of Food in the Last Year: Never true   Transportation Needs: No Transportation Needs (10/29/2023)    PRAPARE - Transportation     Lack of Transportation (Medical): No     Lack of Transportation (Non-Medical): No   Physical Activity: Sufficiently Active (10/29/2023)    Exercise Vital Sign     Days of Exercise per Week: 3 days     Minutes of Exercise per Session: 50 min   Stress: No Stress Concern Present (10/29/2023)    [employer] - Occupational Stress Questionnaire     Feeling of Stress : Not at all   Social Connections: Unknown (10/29/2023)    Social Connection and Isolation Panel [NHANES]     Frequency of Communication with Friends and Family: Twice a week     Frequency of Social Gatherings with Friends and Family: Once a week     Active Member of Clubs or Organizations: Yes     Attends Club or Organization Meetings: More than 4 times per year     Marital Status:    Housing Stability: Low Risk  (10/29/2023)    Housing Stability Vital Sign     Unable to Pay for Housing in the Last Year: No     Number of Places Lived in the Last Year: 1     Unstable Housing in the Last Year: No     Family History   Problem Relation Age of Onset    Cataracts Mother     Dementia Mother     Alzheimer's disease Mother     Macular degeneration Father     Heart disease Father         CABG    Diabetes Father     Hypertension Father     Hearing loss Father     Vision loss Father     Breast cancer Paternal Aunt     Amblyopia Neg Hx     Blindness Neg Hx     Glaucoma Neg Hx     Retinal detachment Neg Hx     Strabismus Neg Hx     Colon cancer Neg Hx     Ovarian cancer Neg Hx     Stroke Neg Hx      OB History          0    Para   0    Term   0       0    AB   0    Living   0         SAB   0    IAB   0    Ectopic   0    Multiple   0    Live Births   0                 /75   Ht 5'  "10" (1.778 m)   Wt 73.4 kg (161 lb 13.1 oz)   LMP 2007 (Approximate)   BMI 23.22 kg/m²         GYN & OB History  Patient's last menstrual period was 2007 (approximate).   Date of Last Pap: 10/26/2022    OB History    Para Term  AB Living   0 0 0 0 0 0   SAB IAB Ectopic Multiple Live Births   0 0 0 0 0       Review of Systems  Review of Systems   Constitutional:  Negative for activity change, appetite change, fatigue and unexpected weight change.   HENT: Negative.     Eyes:  Negative for visual disturbance.   Respiratory:  Negative for shortness of breath and wheezing.    Cardiovascular:  Negative for chest pain, palpitations and leg swelling.   Gastrointestinal:  Negative for abdominal pain, bloating and blood in stool.   Endocrine: Negative for diabetes and hair loss.   Genitourinary:  Positive for vaginal dryness. Negative for decreased libido, dyspareunia and hot flashes.   Musculoskeletal:  Negative for back pain and joint swelling.   Integumentary:  Negative for acne, hair changes and nipple discharge.   Neurological:  Negative for headaches.   Hematological:  Does not bruise/bleed easily.   Psychiatric/Behavioral:  Negative for depression and sleep disturbance. The patient is not nervous/anxious.    Breast: Negative for mastodynia and nipple discharge          Objective:      Physical Exam:   Constitutional: She is oriented to person, place, and time. She appears well-developed and well-nourished.    HENT:   Head: Normocephalic and atraumatic.    Eyes: Pupils are equal, round, and reactive to light. EOM are normal.     Cardiovascular:  Normal rate and regular rhythm.             Pulmonary/Chest: Effort normal and breath sounds normal.   BREASTS:  no mass, no tenderness, no deformity and no retraction. Right breast exhibits no inverted nipple, no mass, no nipple discharge, no skin change, no tenderness, no bleeding and no swelling. Left breast exhibits no inverted nipple, no mass, " no nipple discharge, no skin change, no tenderness, no bleeding and no swelling. Breasts are symmetrical.              Abdominal: Soft. Bowel sounds are normal.     Genitourinary:    Pelvic exam was performed with patient supine.      Genitourinary Comments: PELVIC: Normal external genitalia without lesions.  Normal hair distribution.  Adequate perineal body, normal urethral meatus.  Vagina  Moderately rugated, mildly atrophic, without lesions or discharge.  Cervix pink, without lesions, discharge or tenderness.  No significant cystocele or rectocele.  Bimanual exam shows uterus to be normal size, regular, mobile and nontender.  Adnexa without masses or tenderness.    RECTAL:Deferred                 Musculoskeletal: Normal range of motion and moves all extremeties.       Neurological: She is alert and oriented to person, place, and time.    Skin: Skin is warm and dry.    Psychiatric: She has a normal mood and affect.              Assessment:        1. Encounter for gynecological examination without abnormal finding    2. Postmenopausal atrophic vaginitis    3. Screening mammogram, encounter for    4. Vulvovaginitis                Plan:        Problem List Items Addressed This Visit    None  Visit Diagnoses       Encounter for gynecological examination without abnormal finding    -  Primary  COUNSELING:  The patient was counseled today on regular weight bearing exercise. Patient was counseled today on the new ACS guidelines for cervical cytology screening as well as the current recommendations for breast cancer screening. Counseling session lasted approximately 10 minutes, and all her questions were answered. She was advised to see her primary care physician for all other health maintenance.   FOLLOW-UP with me for next routine visit.       Postmenopausal atrophic vaginitis        Relevant Medications    estradioL (ESTRACE) 0.01 % (0.1 mg/gram) vaginal cream    Screening mammogram, encounter for        Relevant  Orders    Mammo Digital Screening Bilat w/ [facility]    Vulvovaginitis        Relevant Medications    clotrimazole-betamethasone 1-0.05% (LOTRISONE) cream             Follow up in about 1 year (around 10/30/2024).

## 2023-11-21 ENCOUNTER — OFFICE VISIT (OUTPATIENT)
Dept: PRIMARY CARE CLINIC | Facility: CLINIC | Age: 63
End: 2023-11-21
Attending: FAMILY MEDICINE
Payer: COMMERCIAL

## 2023-11-21 VITALS
SYSTOLIC BLOOD PRESSURE: 112 MMHG | HEIGHT: 70 IN | WEIGHT: 165.13 LBS | OXYGEN SATURATION: 99 % | BODY MASS INDEX: 23.64 KG/M2 | HEART RATE: 69 BPM | DIASTOLIC BLOOD PRESSURE: 60 MMHG

## 2023-11-21 DIAGNOSIS — Z13.1 SCREENING FOR DIABETES MELLITUS (DM): ICD-10-CM

## 2023-11-21 DIAGNOSIS — E78.2 MIXED HYPERLIPIDEMIA: Primary | ICD-10-CM

## 2023-11-21 DIAGNOSIS — Z13.29 SCREENING FOR THYROID DISORDER: ICD-10-CM

## 2023-11-21 DIAGNOSIS — Z13.0 SCREENING, IRON DEFICIENCY ANEMIA: ICD-10-CM

## 2023-11-21 PROBLEM — S61.012A LACERATION OF LEFT THUMB: Status: RESOLVED | Noted: 2021-03-11 | Resolved: 2023-11-21

## 2023-11-21 PROCEDURE — 1159F MED LIST DOCD IN RCRD: CPT | Mod: CPTII,S$GLB,, | Performed by: FAMILY MEDICINE

## 2023-11-21 PROCEDURE — 3008F PR BODY MASS INDEX (BMI) DOCUMENTED: ICD-10-PCS | Mod: CPTII,S$GLB,, | Performed by: FAMILY MEDICINE

## 2023-11-21 PROCEDURE — 1159F PR MEDICATION LIST DOCUMENTED IN MEDICAL RECORD: ICD-10-PCS | Mod: CPTII,S$GLB,, | Performed by: FAMILY MEDICINE

## 2023-11-21 PROCEDURE — 3008F BODY MASS INDEX DOCD: CPT | Mod: CPTII,S$GLB,, | Performed by: FAMILY MEDICINE

## 2023-11-21 PROCEDURE — 99999 PR PBB SHADOW E&M-EST. PATIENT-LVL III: CPT | Mod: PBBFAC,,, | Performed by: FAMILY MEDICINE

## 2023-11-21 PROCEDURE — 99203 OFFICE O/P NEW LOW 30 MIN: CPT | Mod: S$GLB,,, | Performed by: FAMILY MEDICINE

## 2023-11-21 PROCEDURE — 1160F RVW MEDS BY RX/DR IN RCRD: CPT | Mod: CPTII,S$GLB,, | Performed by: FAMILY MEDICINE

## 2023-11-21 PROCEDURE — 3074F PR MOST RECENT SYSTOLIC BLOOD PRESSURE < 130 MM HG: ICD-10-PCS | Mod: CPTII,S$GLB,, | Performed by: FAMILY MEDICINE

## 2023-11-21 PROCEDURE — 99203 PR OFFICE/OUTPT VISIT, NEW, LEVL III, 30-44 MIN: ICD-10-PCS | Mod: S$GLB,,, | Performed by: FAMILY MEDICINE

## 2023-11-21 PROCEDURE — 3074F SYST BP LT 130 MM HG: CPT | Mod: CPTII,S$GLB,, | Performed by: FAMILY MEDICINE

## 2023-11-21 PROCEDURE — 3078F DIAST BP <80 MM HG: CPT | Mod: CPTII,S$GLB,, | Performed by: FAMILY MEDICINE

## 2023-11-21 PROCEDURE — 99999 PR PBB SHADOW E&M-EST. PATIENT-LVL III: ICD-10-PCS | Mod: PBBFAC,,, | Performed by: FAMILY MEDICINE

## 2023-11-21 PROCEDURE — 1160F PR REVIEW ALL MEDS BY PRESCRIBER/CLIN PHARMACIST DOCUMENTED: ICD-10-PCS | Mod: CPTII,S$GLB,, | Performed by: FAMILY MEDICINE

## 2023-11-21 PROCEDURE — 3078F PR MOST RECENT DIASTOLIC BLOOD PRESSURE < 80 MM HG: ICD-10-PCS | Mod: CPTII,S$GLB,, | Performed by: FAMILY MEDICINE

## 2023-11-21 RX ORDER — ATORVASTATIN CALCIUM 40 MG/1
40 TABLET, FILM COATED ORAL DAILY
Qty: 90 TABLET | Refills: 3 | Status: CANCELLED | OUTPATIENT
Start: 2023-11-21

## 2023-11-21 NOTE — PROGRESS NOTES
FAMILY MEDICINE  [facility]    Reason for visit:   Chief Complaint   Patient presents with    Establish Care    Hyperlipidemia         SUBJECTIVE: Sandra Mitchell is a 63 y.o. female  - hyperlipidemia presents as a new patient to establish care and follow-up labs. Last PCP Kota Hsu MD    Gynecology:  Nataly Valerio MD  Optometry:Kylie Saldana OD  Mammogram scheduled for 12/29/2023    Sandra Mitchell reports overall that she is doing well.  She is interested in repeating her blood work since she is lost about 20 lb since her last visit with her prior PCP.  She reports that she is been followed with the weight loss clinic.  She is currently on semaglutide 0.75 mg weekly for weight loss.  She enjoys eating healthy.  She has a good hoping fruits and vegetables daily.  She enjoys exercising with swimming.    1. Hyperlipidemia  - no other risk factors  - LDL goal < 100    Current treatment:  atorvastatin (LIPITOR) 40 MG tablet, Take 1 tablet (40 mg total) by mouth once daily., Disp: 90 tablet, Rfl: 3    Side effects from current treatment: none    Exercise: moderately active  Diet: cardiac diet    Family history of CAD:Father  Family history of CVA: No    Lab Results       Component                Value               Date                       CHOL                     191                 02/15/2023                 CHOL                     181                 01/28/2022                 CHOL                     308 (H)             12/02/2021            Lab Results       Component                Value               Date                       HDL                      69                  02/15/2023                 HDL                      69                  01/28/2022                 HDL                      87 (H)              12/02/2021            Lab Results       Component                Value               Date                       LDLCALC                   105.2               02/15/2023                 LDLCALC                  95.6                01/28/2022                 LDLCALC                  204.2 (H)           12/02/2021            Lab Results       Component                Value               Date                       TRIG                     84                  02/15/2023                 TRIG                     82                  01/28/2022                 TRIG                     84                  12/02/2021            Lab Results       Component                Value               Date                       CHOLHDL                  36.1                02/15/2023                 CHOLHDL                  38.1                01/28/2022                 CHOLHDL                  28.2                12/02/2021                        Review of Systems   All other systems reviewed and are negative.      HEALTH MAINTENANCE:   Health Maintenance   Topic Date Due    Mammogram  12/28/2023    Colorectal Cancer Screening  03/13/2026    TETANUS VACCINE  10/18/2026    Lipid Panel  02/15/2028    Hepatitis C Screening  Completed    Shingles Vaccine  Completed     Health Maintenance Topics with due status: Not Due       Topic Last Completion Date    TETANUS VACCINE 10/18/2016    Cervical Cancer Screening 10/20/2022    Lipid Panel 02/15/2023    Colorectal Cancer Screening 03/13/2023     Health Maintenance Due   Topic Date Due    RSV Vaccine (Age 60+ and Pregnant patients) (1 - 1-dose 60+ series) Never done    Mammogram  12/28/2023       HISTORY:   Past Medical History:   Diagnosis Date    Laceration of left thumb 03/11/2021       Past Surgical History:   Procedure Laterality Date    APPENDECTOMY      BREAST CYST ASPIRATION      BREAST CYST INCISION AND DRAINAGE      DILATION AND CURETTAGE OF UTERUS      TUBAL LIGATION      Bilateral salpingectomy       Family History   Problem Relation Age of Onset    Cataracts Mother     Dementia Mother     Alzheimer's disease  "Mother     Macular degeneration Father     Heart disease Father         CABG    Diabetes Father     Hypertension Father     Hearing loss Father     Vision loss Father     Endometriosis Sister     Breast cancer Paternal Aunt     Amblyopia Neg Hx     Blindness Neg Hx     Glaucoma Neg Hx     Retinal detachment Neg Hx     Strabismus Neg Hx     Colon cancer Neg Hx     Ovarian cancer Neg Hx     Stroke Neg Hx        Social History     Tobacco Use    Smoking status: Never    Smokeless tobacco: Never   Substance Use Topics    Alcohol use: Yes     Alcohol/week: 4.0 standard drinks of alcohol     Types: 4 Glasses of wine per week     Comment: per week    Drug use: No       Social History     Social History Narrative    . . No children. 2 dogs (rat terrier and judi) and 1 cat.        ALLERGIES:   Review of patient's allergies indicates:   Allergen Reactions    Aspirin Hives     Other reaction(s): Hives       MEDS:   Current Outpatient Medications on File Prior to Visit   Medication Sig Dispense Refill Last Dose    atorvastatin (LIPITOR) 40 MG tablet Take 1 tablet (40 mg total) by mouth once daily. 90 tablet 3 Taking    clotrimazole-betamethasone 1-0.05% (LOTRISONE) cream Apply to affected area 2 times daily 15 g 3 Taking    estradioL (ESTRACE) 0.01 % (0.1 mg/gram) vaginal cream Place 1 g vaginally every other day. 42 g 4 Taking    SF 5000 PLUS 1.1 % Crea   0 Taking    SEMAGLUTIDE, WEIGHT LOSS, SUBQ Inject 0.75 mg into the skin once a week.            Vital signs:   Vitals:    11/21/23 1326   BP: 112/60   Pulse: 69   SpO2: 99%   Weight: 74.9 kg (165 lb 2 oz)   Height: 5' 10" (1.778 m)     Body mass index is 23.69 kg/m².    PHYSICAL EXAM:     Physical Exam  Vitals reviewed.   Constitutional:       General: She is not in acute distress.  HENT:      Head: Normocephalic and atraumatic.      Right Ear: Tympanic membrane and ear canal normal.      Left Ear: Tympanic membrane and ear canal normal.   Eyes:      " General: No scleral icterus.     Conjunctiva/sclera: Conjunctivae normal.   Neck:      Thyroid: No thyromegaly.      Vascular: No carotid bruit.   Cardiovascular:      Rate and Rhythm: Normal rate and regular rhythm.      Pulses: Normal pulses.      Heart sounds: Normal heart sounds. No murmur heard.     No friction rub. No gallop.   Pulmonary:      Effort: Pulmonary effort is normal.      Breath sounds: Normal breath sounds. No wheezing or rales.   Abdominal:      General: Bowel sounds are normal. There is no distension.      Palpations: Abdomen is soft.      Tenderness: There is no abdominal tenderness.   Musculoskeletal:      Cervical back: Normal range of motion and neck supple.      Right lower leg: No edema.      Left lower leg: No edema.   Lymphadenopathy:      Cervical: No cervical adenopathy.   Skin:     General: Skin is warm.      Capillary Refill: Capillary refill takes less than 2 seconds.   Neurological:      Mental Status: She is alert.             PERTINENT RESULTS:   No visits with results within 1 Week(s) from this visit.   Latest known visit with results is:   Office Visit on 03/02/2023   Component Date Value Ref Range Status    Cologuard Result 03/13/2023 Negative  Negative Final    Comment:   NEGATIVE TEST RESULT. A negative Cologuard result indicates a low likelihood that a colorectal cancer (CRC) or advanced adenoma (adenomatous polyps with more advanced pre-malignant features)  is present. The chance that a person with a negative Cologuard test has a colorectal cancer is less than 1 in 1500 (negative predictive value >99.9%) or has an  advanced adenoma is less than  5.3% (negative predictive value 94.7%). These data are based on a prospective cross-sectional study of 10,000 individuals at average risk for colorectal cancer who were screened with both Cologuard and colonoscopy. (Dexter Ac al, N Engl J Med 2014;370(14):5822-7671) The normal value (reference range) for this assay is  negative.    COLOGUARD RE-SCREENING RECOMMENDATION: Periodic colorectal cancer screening is an important part of preventive healthcare for asymptomatic individuals at average risk for colorectal cancer.  Following a negative Cologuard result, the American Cancer Society and U.S.                            Multi-Society Task Force screening guidelines recommend a Cologuard re-screening interval of 3 years.   References: American Cancer Society Guideline for Colorectal Cancer Screening: https://www.cancer.org/cancer/colon-rectal-cancer/kslnvjmvq-uahgwqjvr-xoeakre/acs-recommendations.html.; Julian DK, Ashley YEPEZ, Darshan HALLK, Colorectal Cancer Screening: Recommendations for Physicians and Patients from the U.S. Multi-Society Task Force on Colorectal Cancer Screening , Am J Gastroenterology 2017; 112:4869-7748.    TEST DESCRIPTION: Composite algorithmic analysis of stool DNA-biomarkers with hemoglobin immunoassay.   Quantitative values of individual biomarkers are not reportable and are not associated with individual biomarker result reference ranges. Cologuard is intended for colorectal cancer screening of adults of either sex, 45 years or older, who are at average-risk for colorectal cancer (CRC). Cologuard has been approved for use by the U.S. FDA. The performance of Cologuard was                            established in a cross sectional study of average-risk adults aged 50-84. Cologuard performance in patients ages 45 to 49 years was estimated by sub-group analysis of near-age groups. Colonoscopies performed for a positive result may find as the most clinically significant lesion: colorectal cancer [4.0%], advanced adenoma (including sessile serrated polyps greater than or equal to 1cm diameter) [20%] or non- advanced adenoma [31%]; or no colorectal neoplasia [45%]. These estimates are derived from a prospective cross-sectional screening study of 10,000 individuals at average risk for colorectal cancer who were  screened with both Cologuard and colonoscopy. (Dexter Ac al, N Engl J Med 2014;370(14):3155-9492.) Cologuard may produce a false negative or false positive result (no colorectal cancer or precancerous polyp present at colonoscopy follow up). A negative Cologuard test result does not guarantee the absence of CRC or advanced adenoma (pre-cancer). The current Cologuard                            screening interval is every 3 years. (American Cancer Society and U.S. Multi-Society Task Force). Cologuard performance data in a 10,000 patient pivotal study using colonoscopy as the reference method can be accessed at the following location: [email or web address]/results. Additional description of the Cologuard test process, warnings and precautions can be found at www.[employer].       Reading Physician Reading Date Result Priority   Demond Jacobs MD  [phone] 1/2/2023 Routine     Physician Responsible for MQSA Outcome Reason    Demond Jacobs MD Signed      Narrative & Impression  Result:  Mammo Digital Screening Bilat w/ Josiah     History:  Patient is 62 y.o. and is seen for a screening mammogram.        Films Compared:  Prior images (if available) were compared.      Findings:   This procedure was performed using tomosynthesis.   Computer-aided detection was utilized in the interpretation of this examination.     The breasts are heterogeneously dense, which may obscure small masses. There is no evidence of suspicious masses, microcalcifications or architectural distortion.     Impression:   No mammographic evidence of malignancy.     BI-RADS Category 1: Negative     Recommendation:  Routine screening mammogram in 1 year is recommended.     Your estimated lifetime risk of breast cancer (to age 85) based on Tyrer-Cuzick risk assessment model is 13.33 %.  According to the American Cancer Society, patients with a lifetime breast cancer risk of 20% or higher might benefit from supplemental screening tests.  ??                     Exam Ended: 12/28/22 08:50 Last Resulted: 01/02/23 04:14           Lab Results   Component Value Date    CHOL 191 02/15/2023    CHOL 181 01/28/2022    CHOL 308 (H) 12/02/2021     Lab Results   Component Value Date    HDL 69 02/15/2023    HDL 69 01/28/2022    HDL 87 (H) 12/02/2021     Lab Results   Component Value Date    LDLCALC 105.2 02/15/2023    LDLCALC 95.6 01/28/2022    LDLCALC 204.2 (H) 12/02/2021     Lab Results   Component Value Date    TRIG 84 02/15/2023    TRIG 82 01/28/2022    TRIG 84 12/02/2021       Lab Results   Component Value Date    CHOLHDL 36.1 02/15/2023    CHOLHDL 38.1 01/28/2022    CHOLHDL 28.2 12/02/2021     CMP  Sodium   Date Value Ref Range Status   02/15/2023 139 136 - 145 mmol/L Final     Potassium   Date Value Ref Range Status   02/15/2023 4.4 3.5 - 5.1 mmol/L Final     Chloride   Date Value Ref Range Status   02/15/2023 104 95 - 110 mmol/L Final     CO2   Date Value Ref Range Status   02/15/2023 25 23 - 29 mmol/L Final     Glucose   Date Value Ref Range Status   02/15/2023 104 70 - 110 mg/dL Final     BUN   Date Value Ref Range Status   02/15/2023 11 6 - 20 mg/dL Final     Creatinine   Date Value Ref Range Status   02/15/2023 0.8 0.5 - 1.4 mg/dL Final     Calcium   Date Value Ref Range Status   02/15/2023 9.8 8.7 - 10.5 mg/dL Final     Total Protein   Date Value Ref Range Status   02/15/2023 6.9 6.0 - 8.4 g/dL Final     Albumin   Date Value Ref Range Status   02/15/2023 4.2 3.5 - 5.2 g/dL Final     Total Bilirubin   Date Value Ref Range Status   02/15/2023 0.6 0.1 - 1.0 mg/dL Final     Comment:     For infants and newborns, interpretation of results should be based  on gestational age, weight and in agreement with clinical  observations.    Premature Infant recommended reference ranges:  Up to 24 hours.............<8.0 mg/dL  Up to 48 hours............<12.0 mg/dL  3-5 days..................<15.0 mg/dL  6-29 days.................<15.0 mg/dL       Alkaline Phosphatase    Date Value Ref Range Status   02/15/2023 76 55 - 135 U/L Final     AST   Date Value Ref Range Status   02/15/2023 21 10 - 40 U/L Final     ALT   Date Value Ref Range Status   02/15/2023 20 10 - 44 U/L Final     Anion Gap   Date Value Ref Range Status   02/15/2023 10 8 - 16 mmol/L Final     eGFR   Date Value Ref Range Status   02/15/2023 >60.0 >60 mL/min/1.73 m^2 Final     Lab Results   Component Value Date    HGBA1C 5.7 08/14/2013         ASSESSMENT/PLAN:    1. Mixed hyperlipidemia  Overview:  Lab Results   Component Value Date    LDLCALC 105.2 02/15/2023     - starting   - she is also significant amount of weight and would like to repeat her lipid   -discussed she may have familiar hyperlipidemia based off of her initial LDL in 2021 however discussed if her LDL improves less than 70 we can reduce her atorvastatin to 20 mg daily  - he is tolerating the medication well and denies any unwanted side effects    Orders:  -     Lipid Panel; Future; Expected date: 11/21/2023  -     Comprehensive Metabolic Panel; Future; Expected date: 11/21/2023    2. Screening, iron deficiency anemia  -     CBC Auto Differential; Future; Expected date: 11/21/2023    3. Screening for diabetes mellitus (DM)  -     Hemoglobin A1C; Future; Expected date: 11/21/2023    4. Screening for thyroid disorder  -     TSH; Future; Expected date: 11/21/2023          ORDERS:   Orders Placed This Encounter    TSH    Lipid Panel    Hemoglobin A1C    Comprehensive Metabolic Panel    CBC Auto Differential       Vaccines recommended:  RSV    Follow-up in 1 year pending result or sooner if any concerns.      This note is dictated using the M*Modal Fluency Direct word recognition program. There are word recognition mistakes that are occasionally missed on review.    Dr. Ligia Gracia D.O.   Family Medicine

## 2023-11-22 ENCOUNTER — LAB VISIT (OUTPATIENT)
Dept: LAB | Facility: HOSPITAL | Age: 63
End: 2023-11-22
Attending: FAMILY MEDICINE
Payer: COMMERCIAL

## 2023-11-22 DIAGNOSIS — Z13.1 SCREENING FOR DIABETES MELLITUS (DM): ICD-10-CM

## 2023-11-22 DIAGNOSIS — Z13.0 SCREENING, IRON DEFICIENCY ANEMIA: ICD-10-CM

## 2023-11-22 DIAGNOSIS — E78.2 MIXED HYPERLIPIDEMIA: ICD-10-CM

## 2023-11-22 DIAGNOSIS — Z13.29 SCREENING FOR THYROID DISORDER: ICD-10-CM

## 2023-11-22 LAB
ALBUMIN SERPL BCP-MCNC: 4.7 G/DL (ref 3.5–5.2)
ALP SERPL-CCNC: 82 U/L (ref 55–135)
ALT SERPL W/O P-5'-P-CCNC: 19 U/L (ref 10–44)
ANION GAP SERPL CALC-SCNC: 11 MMOL/L (ref 8–16)
AST SERPL-CCNC: 22 U/L (ref 10–40)
BASOPHILS # BLD AUTO: 0.14 K/UL (ref 0–0.2)
BASOPHILS NFR BLD: 2.6 % (ref 0–1.9)
BILIRUB SERPL-MCNC: 0.9 MG/DL (ref 0.1–1)
BUN SERPL-MCNC: 7 MG/DL (ref 8–23)
CALCIUM SERPL-MCNC: 10 MG/DL (ref 8.7–10.5)
CHLORIDE SERPL-SCNC: 100 MMOL/L (ref 95–110)
CHOLEST SERPL-MCNC: 179 MG/DL (ref 120–199)
CHOLEST/HDLC SERPL: 2.4 {RATIO} (ref 2–5)
CO2 SERPL-SCNC: 27 MMOL/L (ref 23–29)
CREAT SERPL-MCNC: 0.8 MG/DL (ref 0.5–1.4)
DIFFERENTIAL METHOD: ABNORMAL
EOSINOPHIL # BLD AUTO: 0.3 K/UL (ref 0–0.5)
EOSINOPHIL NFR BLD: 5.6 % (ref 0–8)
ERYTHROCYTE [DISTWIDTH] IN BLOOD BY AUTOMATED COUNT: 14.6 % (ref 11.5–14.5)
EST. GFR  (NO RACE VARIABLE): >60 ML/MIN/1.73 M^2
ESTIMATED AVG GLUCOSE: 97 MG/DL (ref 68–131)
GLUCOSE SERPL-MCNC: 82 MG/DL (ref 70–110)
HBA1C MFR BLD: 5 % (ref 4–5.6)
HCT VFR BLD AUTO: 41.5 % (ref 37–48.5)
HDLC SERPL-MCNC: 76 MG/DL (ref 40–75)
HDLC SERPL: 42.5 % (ref 20–50)
HGB BLD-MCNC: 13.5 G/DL (ref 12–16)
IMM GRANULOCYTES # BLD AUTO: 0.01 K/UL (ref 0–0.04)
IMM GRANULOCYTES NFR BLD AUTO: 0.2 % (ref 0–0.5)
LDLC SERPL CALC-MCNC: 87.2 MG/DL (ref 63–159)
LYMPHOCYTES # BLD AUTO: 2.3 K/UL (ref 1–4.8)
LYMPHOCYTES NFR BLD: 41.9 % (ref 18–48)
MCH RBC QN AUTO: 28.8 PG (ref 27–31)
MCHC RBC AUTO-ENTMCNC: 32.5 G/DL (ref 32–36)
MCV RBC AUTO: 89 FL (ref 82–98)
MONOCYTES # BLD AUTO: 0.5 K/UL (ref 0.3–1)
MONOCYTES NFR BLD: 9.1 % (ref 4–15)
NEUTROPHILS # BLD AUTO: 2.2 K/UL (ref 1.8–7.7)
NEUTROPHILS NFR BLD: 40.6 % (ref 38–73)
NONHDLC SERPL-MCNC: 103 MG/DL
NRBC BLD-RTO: 0 /100 WBC
PLATELET # BLD AUTO: 464 K/UL (ref 150–450)
PMV BLD AUTO: 9.8 FL (ref 9.2–12.9)
POTASSIUM SERPL-SCNC: 3.9 MMOL/L (ref 3.5–5.1)
PROT SERPL-MCNC: 7.7 G/DL (ref 6–8.4)
RBC # BLD AUTO: 4.69 M/UL (ref 4–5.4)
SODIUM SERPL-SCNC: 138 MMOL/L (ref 136–145)
TRIGL SERPL-MCNC: 79 MG/DL (ref 30–150)
TSH SERPL DL<=0.005 MIU/L-ACNC: 2.87 UIU/ML (ref 0.4–4)
WBC # BLD AUTO: 5.49 K/UL (ref 3.9–12.7)

## 2023-11-22 PROCEDURE — 84443 ASSAY THYROID STIM HORMONE: CPT | Performed by: FAMILY MEDICINE

## 2023-11-22 PROCEDURE — 85025 COMPLETE CBC W/AUTO DIFF WBC: CPT | Performed by: FAMILY MEDICINE

## 2023-11-22 PROCEDURE — 36415 COLL VENOUS BLD VENIPUNCTURE: CPT | Performed by: FAMILY MEDICINE

## 2023-11-22 PROCEDURE — 80053 COMPREHEN METABOLIC PANEL: CPT | Performed by: FAMILY MEDICINE

## 2023-11-22 PROCEDURE — 80061 LIPID PANEL: CPT | Performed by: FAMILY MEDICINE

## 2023-11-22 PROCEDURE — 83036 HEMOGLOBIN GLYCOSYLATED A1C: CPT | Performed by: FAMILY MEDICINE

## 2023-11-27 ENCOUNTER — PATIENT MESSAGE (OUTPATIENT)
Dept: PRIMARY CARE CLINIC | Facility: CLINIC | Age: 63
End: 2023-11-27
Payer: COMMERCIAL

## 2023-11-27 DIAGNOSIS — D75.839 THROMBOCYTOSIS: Primary | ICD-10-CM

## 2023-11-27 DIAGNOSIS — E78.2 MIXED HYPERLIPIDEMIA: ICD-10-CM

## 2023-11-27 RX ORDER — ATORVASTATIN CALCIUM 40 MG/1
40 TABLET, FILM COATED ORAL DAILY
Qty: 90 TABLET | Refills: 3 | Status: SHIPPED | OUTPATIENT
Start: 2023-11-27 | End: 2024-02-15 | Stop reason: SDUPTHER

## 2024-01-02 ENCOUNTER — OFFICE VISIT (OUTPATIENT)
Dept: OPTOMETRY | Facility: CLINIC | Age: 64
End: 2024-01-02
Payer: COMMERCIAL

## 2024-01-02 DIAGNOSIS — H25.13 NUCLEAR SCLEROSIS, BILATERAL: Primary | ICD-10-CM

## 2024-01-02 DIAGNOSIS — H52.13 MYOPIA OF BOTH EYES WITH ASTIGMATISM AND PRESBYOPIA: ICD-10-CM

## 2024-01-02 DIAGNOSIS — Z46.0 FITTING AND ADJUSTMENT OF SPECTACLES AND CONTACT LENSES: Primary | ICD-10-CM

## 2024-01-02 DIAGNOSIS — H52.203 MYOPIA OF BOTH EYES WITH ASTIGMATISM AND PRESBYOPIA: ICD-10-CM

## 2024-01-02 DIAGNOSIS — Z83.511 FAMILY HISTORY OF GLAUCOMA IN BROTHER: ICD-10-CM

## 2024-01-02 DIAGNOSIS — Z97.3 WEARS CONTACT LENSES: ICD-10-CM

## 2024-01-02 DIAGNOSIS — Z83.518 FAMILY HISTORY OF MACULAR DEGENERATION: ICD-10-CM

## 2024-01-02 DIAGNOSIS — H52.4 MYOPIA OF BOTH EYES WITH ASTIGMATISM AND PRESBYOPIA: ICD-10-CM

## 2024-01-02 PROCEDURE — 92015 DETERMINE REFRACTIVE STATE: CPT | Mod: S$GLB,,, | Performed by: OPTOMETRIST

## 2024-01-02 PROCEDURE — 1159F MED LIST DOCD IN RCRD: CPT | Mod: CPTII,S$GLB,, | Performed by: OPTOMETRIST

## 2024-01-02 PROCEDURE — 92310 CONTACT LENS FITTING OU: CPT | Mod: S$GLB,,, | Performed by: OPTOMETRIST

## 2024-01-02 PROCEDURE — 1160F RVW MEDS BY RX/DR IN RCRD: CPT | Mod: CPTII,S$GLB,, | Performed by: OPTOMETRIST

## 2024-01-02 PROCEDURE — 99999 PR PBB SHADOW E&M-EST. PATIENT-LVL III: CPT | Mod: PBBFAC,,, | Performed by: OPTOMETRIST

## 2024-01-02 PROCEDURE — 92014 COMPRE OPH EXAM EST PT 1/>: CPT | Mod: S$GLB,,, | Performed by: OPTOMETRIST

## 2024-01-02 NOTE — PROGRESS NOTES
HPI    64 Y/o female is here for routine eye exam with C/o pt states she notices   her distance vision is changing no problems driving out night   Pt denies pain and discomfort   Occ Flashes and floaters pt states she begin seeing floater about 3 mos     Eye med: Lumify OU QD   Last edited by Ashwini Hathaway MA on 1/2/2024  1:49 PM.            Assessment /Plan     For exam results, see Encounter Report.    Nuclear sclerosis, bilateral    Family history of glaucoma in brother    Family history of macular degeneration    Myopia of both eyes with astigmatism and presbyopia    Wears contact lenses      Slight hyp shift 2 to Cat OU--wrote new spex Rx--pt has lined trifocals, but also lined bifocals w top for computer/bottom for near  Fam hx glaucoma/ARMD--advised yearly exam  Pt wearing BIOFINITY TORIC (OS near) that she got from Dr Saldana in 2018.  Just wears a few days a year.  Discussed daily disp as option for part time wear, and pt wishes to try.  Good fit/VA w DT1 Toric (OS near)--adjusted power to reflect todays refraction    PLAN:    DISP trial Cls--5 pairs  Continue Daily Wear schedule.  NO SLEEPING IN CONTACT LENSES. Exchange daily  Pt will message me after wearing--if happy will release CLRx, rtc 1 yr

## 2024-01-08 ENCOUNTER — TELEPHONE (OUTPATIENT)
Dept: RADIOLOGY | Facility: HOSPITAL | Age: 64
End: 2024-01-08
Payer: COMMERCIAL

## 2024-01-08 ENCOUNTER — TELEPHONE (OUTPATIENT)
Dept: OBSTETRICS AND GYNECOLOGY | Facility: CLINIC | Age: 64
End: 2024-01-08
Payer: COMMERCIAL

## 2024-01-08 ENCOUNTER — PATIENT MESSAGE (OUTPATIENT)
Dept: OBSTETRICS AND GYNECOLOGY | Facility: CLINIC | Age: 64
End: 2024-01-08
Payer: COMMERCIAL

## 2024-01-08 DIAGNOSIS — Z12.31 SCREENING MAMMOGRAM, ENCOUNTER FOR: Primary | ICD-10-CM

## 2024-01-08 NOTE — TELEPHONE ENCOUNTER
----- Message from Caden Granda sent at 1/8/2024  2:07 PM CST -----  Regarding: appt  Contact: Pt [phone]  [place] calling to schedule(Kelsey Valerioaret) mammogram appt. Attempted to schedule. Please call [phone]

## 2024-01-09 ENCOUNTER — PATIENT MESSAGE (OUTPATIENT)
Dept: OPTOMETRY | Facility: CLINIC | Age: 64
End: 2024-01-09
Payer: COMMERCIAL

## 2024-01-09 ENCOUNTER — HOSPITAL ENCOUNTER (OUTPATIENT)
Dept: RADIOLOGY | Facility: HOSPITAL | Age: 64
Discharge: HOME | End: 2024-01-09
Attending: OBSTETRICS & GYNECOLOGY
Payer: COMMERCIAL

## 2024-01-09 VITALS — HEIGHT: 70 IN | WEIGHT: 165 LBS | BODY MASS INDEX: 23.62 KG/M2

## 2024-01-09 DIAGNOSIS — Z12.31 SCREENING MAMMOGRAM, ENCOUNTER FOR: ICD-10-CM

## 2024-01-09 PROCEDURE — 77067 SCR MAMMO BI INCL CAD: CPT | Mod: TC

## 2024-01-09 PROCEDURE — 77067 SCR MAMMO BI INCL CAD: CPT | Mod: 26,,, | Performed by: RADIOLOGY

## 2024-01-09 PROCEDURE — 77063 BREAST TOMOSYNTHESIS BI: CPT | Mod: 26,,, | Performed by: RADIOLOGY

## 2024-01-11 ENCOUNTER — PATIENT MESSAGE (OUTPATIENT)
Dept: PRIMARY CARE CLINIC | Facility: CLINIC | Age: 64
End: 2024-01-11
Payer: COMMERCIAL

## 2024-02-02 ENCOUNTER — PATIENT MESSAGE (OUTPATIENT)
Dept: OPTOMETRY | Facility: CLINIC | Age: 64
End: 2024-02-02
Payer: COMMERCIAL

## 2024-02-15 ENCOUNTER — PATIENT MESSAGE (OUTPATIENT)
Dept: PRIMARY CARE CLINIC | Facility: CLINIC | Age: 64
End: 2024-02-15
Payer: COMMERCIAL

## 2024-02-15 DIAGNOSIS — E78.2 MIXED HYPERLIPIDEMIA: ICD-10-CM

## 2024-02-15 RX ORDER — ATORVASTATIN CALCIUM 40 MG/1
40 TABLET, FILM COATED ORAL DAILY
Qty: 90 TABLET | Refills: 3 | Status: SHIPPED | OUTPATIENT
Start: 2024-02-15

## 2024-02-15 NOTE — TELEPHONE ENCOUNTER
No care due was identified.  Health [facility] Embedded Care Due Messages. Reference number: [id number].   2/15/2024 11:47:09 AM CST

## 2024-02-15 NOTE — TELEPHONE ENCOUNTER
Refill Encounter    PCP Visits: Recent Visits  Date Type Provider Dept   11/21/23 Office Visit Ligia Gracia DO [facility]   06/26/23 Office Visit Kota Valdez MD [facility]   03/02/23 Office Visit Kota Valdez MD [facility]   Showing recent visits within past 360 days and meeting all other requirements  Future Appointments  No visits were found meeting these conditions.  Showing future appointments within next 720 days and meeting all other requirements     Last 3 Blood Pressure:   BP Readings from Last 3 Encounters:   11/21/23 112/60   10/30/23 122/75   06/26/23 120/76     Preferred Pharmacy:   [facility] [id number] [address] [address]  Phone: [phone] Fax: [phone]    Requested RX:  Requested Prescriptions     Pending Prescriptions Disp Refills    atorvastatin (LIPITOR) 40 MG tablet 90 tablet 3     Sig: Take 1 tablet (40 mg total) by mouth once daily.      RX Route: Normal

## 2024-10-30 ENCOUNTER — PATIENT MESSAGE (OUTPATIENT)
Dept: OBSTETRICS AND GYNECOLOGY | Facility: CLINIC | Age: 64
End: 2024-10-30
Payer: COMMERCIAL

## 2024-11-04 ENCOUNTER — PATIENT MESSAGE (OUTPATIENT)
Dept: OBSTETRICS AND GYNECOLOGY | Facility: CLINIC | Age: 64
End: 2024-11-04
Payer: COMMERCIAL

## 2024-11-11 ENCOUNTER — OFFICE VISIT (OUTPATIENT)
Dept: OBSTETRICS AND GYNECOLOGY | Facility: CLINIC | Age: 64
End: 2024-11-11
Payer: COMMERCIAL

## 2024-11-11 VITALS
DIASTOLIC BLOOD PRESSURE: 74 MMHG | HEIGHT: 70 IN | SYSTOLIC BLOOD PRESSURE: 137 MMHG | WEIGHT: 155.88 LBS | BODY MASS INDEX: 22.32 KG/M2

## 2024-11-11 DIAGNOSIS — Z78.0 MENOPAUSE PRESENT: ICD-10-CM

## 2024-11-11 DIAGNOSIS — Z13.820 SCREENING FOR OSTEOPOROSIS: ICD-10-CM

## 2024-11-11 DIAGNOSIS — Z01.419 ENCOUNTER FOR GYNECOLOGICAL EXAMINATION WITHOUT ABNORMAL FINDING: Primary | ICD-10-CM

## 2024-11-11 DIAGNOSIS — N76.0 VULVOVAGINITIS: ICD-10-CM

## 2024-11-11 DIAGNOSIS — N95.2 POSTMENOPAUSAL ATROPHIC VAGINITIS: ICD-10-CM

## 2024-11-11 PROCEDURE — 3075F SYST BP GE 130 - 139MM HG: CPT | Mod: CPTII,S$GLB,, | Performed by: OBSTETRICS & GYNECOLOGY

## 2024-11-11 PROCEDURE — 3008F BODY MASS INDEX DOCD: CPT | Mod: CPTII,S$GLB,, | Performed by: OBSTETRICS & GYNECOLOGY

## 2024-11-11 PROCEDURE — 3078F DIAST BP <80 MM HG: CPT | Mod: CPTII,S$GLB,, | Performed by: OBSTETRICS & GYNECOLOGY

## 2024-11-11 PROCEDURE — 99396 PREV VISIT EST AGE 40-64: CPT | Mod: S$GLB,,, | Performed by: OBSTETRICS & GYNECOLOGY

## 2024-11-11 PROCEDURE — 1159F MED LIST DOCD IN RCRD: CPT | Mod: CPTII,S$GLB,, | Performed by: OBSTETRICS & GYNECOLOGY

## 2024-11-11 PROCEDURE — 1160F RVW MEDS BY RX/DR IN RCRD: CPT | Mod: CPTII,S$GLB,, | Performed by: OBSTETRICS & GYNECOLOGY

## 2024-11-11 PROCEDURE — 99999 PR PBB SHADOW E&M-EST. PATIENT-LVL III: CPT | Mod: PBBFAC,,, | Performed by: OBSTETRICS & GYNECOLOGY

## 2024-11-11 RX ORDER — ESTRADIOL 0.1 MG/G
1 CREAM VAGINAL EVERY OTHER DAY
Qty: 42 G | Refills: 5 | Status: SHIPPED | OUTPATIENT
Start: 2024-11-11

## 2024-11-11 RX ORDER — CLOTRIMAZOLE AND BETAMETHASONE DIPROPIONATE 10; .64 MG/G; MG/G
CREAM TOPICAL
Qty: 15 G | Refills: 3 | Status: SHIPPED | OUTPATIENT
Start: 2024-11-11 | End: 2025-11-11

## 2024-11-11 NOTE — PROGRESS NOTES
Subjective:       Patient ID: Sandra Mitchell is a 64 y.o. female.    Chief Complaint:  Well Woman and Gynecologic Exam      History of Present Illness  HPI    Sandra Mitchell is a 64 y.o. female  here for her annual GYN exam.    She is menopausal since age 47 and is not on HRT. Uses vaginal estradiol cream with good improvement in her vaginal moisture.  denies break through bleeding.   denies vaginal itching or irritation.  Denies vaginal discharge.  She is sexually active. She has had1 partner for 10 years .     History of abnormal pap: No  Last Pap: approximate date 10- and was normal(neg HR HPV)  Last MMG: normal-2024:BI-RADS Category 1: Negative -routine follow-up in 12 months  Last Dexa: 2019:Osteopenia   Last Colonoscopy:  Cologuard : normal  denies domestic violence. She does feel safe at home.     Past Medical History:   Diagnosis Date    Laceration of left thumb 2021     Past Surgical History:   Procedure Laterality Date    APPENDECTOMY      BREAST CYST ASPIRATION      BREAST CYST INCISION AND DRAINAGE      DILATION AND CURETTAGE OF UTERUS      TUBAL LIGATION      Bilateral salpingectomy     Social History     Socioeconomic History    Marital status:     Number of children: 0   Tobacco Use    Smoking status: Never    Smokeless tobacco: Never   Substance and Sexual Activity    Alcohol use: Yes     Alcohol/week: 4.0 standard drinks of alcohol     Types: 4 Glasses of wine per week     Comment: per week    Drug use: No    Sexual activity: Yes     Partners: Male     Birth control/protection: Post-menopausal     Comment:  since    Social History Narrative    . . No children. 2 dogs (rat terrier and judi) and 1 cat.      Social Drivers of Health     Financial Resource Strain: Low Risk  (2023)    Overall Financial Resource Strain (CARDIA)     Difficulty of Paying Living Expenses: Not hard at all   Food  "Insecurity: No Food Insecurity (2023)    Hunger Vital Sign     Worried About Running Out of Food in the Last Year: Never true     Ran Out of Food in the Last Year: Never true   Transportation Needs: No Transportation Needs (2023)    PRAPARE - Transportation     Lack of Transportation (Medical): No     Lack of Transportation (Non-Medical): No   Physical Activity: Insufficiently Active (2023)    Exercise Vital Sign     Days of Exercise per Week: 3 days     Minutes of Exercise per Session: 40 min   Stress: No Stress Concern Present (2023)    [employer] [place] - Occupational Stress Questionnaire     Feeling of Stress : Only a little   Housing Stability: Low Risk  (2023)    Housing Stability Vital Sign     Unable to Pay for Housing in the Last Year: No     Number of Places Lived in the Last Year: 1     Unstable Housing in the Last Year: No     Family History   Problem Relation Name Age of Onset    Cataracts Mother 83     Dementia Mother 83     Alzheimer's disease Mother 83     Cataracts Father Cesar Mitchell     Macular degeneration Father Cesar Mitchell     Heart disease Father Cesar Mitchell         CABG    Diabetes Father Cesar Mitchell     Hypertension Father Cesar Mitchell     Hearing loss Father Cesar Mitchell     Vision loss Father Cesar Mitchell     Cataracts Sister      Endometriosis Sister      Glaucoma Brother      Breast cancer Paternal Aunt      Amblyopia Neg Hx      Blindness Neg Hx      Retinal detachment Neg Hx      Strabismus Neg Hx      Colon cancer Neg Hx      Ovarian cancer Neg Hx      Stroke Neg Hx       OB History          0    Para   0    Term   0       0    AB   0    Living   0         SAB   0    IAB   0    Ectopic   0    Multiple   0    Live Births   0                 /74   Ht 5' 10" (1.778 m)   Wt 70.7 kg (155 lb 13.8 oz)   LMP 2007 (Approximate)   BMI 22.36 kg/m²         GYN & OB History  Patient's last menstrual period " was 2007 (approximate).   Date of Last Pap: 10/26/2022    OB History    Para Term  AB Living   0 0 0 0 0 0   SAB IAB Ectopic Multiple Live Births   0 0 0 0 0       Review of Systems  Review of Systems   Constitutional:  Negative for activity change, appetite change, fatigue and unexpected weight change.   HENT: Negative.     Eyes:  Negative for visual disturbance.   Respiratory:  Negative for shortness of breath and wheezing.    Cardiovascular:  Negative for chest pain, palpitations and leg swelling.   Gastrointestinal:  Negative for abdominal pain, bloating and blood in stool.   Endocrine: Negative for diabetes and hair loss.   Genitourinary:  Negative for decreased libido, dyspareunia, hot flashes and vaginal dryness.   Musculoskeletal:  Negative for back pain and joint swelling.   Integumentary:  Negative for acne, hair changes and nipple discharge.   Neurological:  Negative for headaches.   Hematological:  Does not bruise/bleed easily.   Psychiatric/Behavioral:  Negative for depression and sleep disturbance. The patient is not nervous/anxious.    Breast: Negative for mastodynia and nipple discharge          Objective:      Physical Exam:   Constitutional: She is oriented to person, place, and time. She appears well-developed and well-nourished.    HENT:   Head: Normocephalic and atraumatic.    Eyes: Pupils are equal, round, and reactive to light. EOM are normal.     Cardiovascular:  Normal rate and regular rhythm.             Pulmonary/Chest: Effort normal and breath sounds normal.   BREASTS:  no mass, no tenderness, no deformity and no retraction. Right breast exhibits no inverted nipple, no mass, no nipple discharge, no skin change, no tenderness, no bleeding and no swelling. Left breast exhibits no inverted nipple, no mass, no nipple discharge, no skin change, no tenderness, no bleeding and no swelling. Breasts are symmetrical.              Abdominal: Soft. Bowel sounds are normal.      Genitourinary:    Pelvic exam was performed with patient supine.      Genitourinary Comments: PELVIC: Normal external genitalia without lesions.  Normal hair distribution.  Adequate perineal body, normal urethral meatus.  Vagina  Moderately rugated, Mildly atrophic, without lesions or discharge.  Cervix pink, Nulliparous appearing without lesions, discharge or tenderness.  No significant cystocele or rectocele.  Bimanual exam shows uterus to be normal size, regular, mobile and nontender.  Adnexa without masses or tenderness.    RECTAL:Deferred                 Musculoskeletal: Normal range of motion and moves all extremeties.       Neurological: She is alert and oriented to person, place, and time.    Skin: Skin is warm and dry.    Psychiatric: She has a normal mood and affect.              Assessment:        1. Encounter for gynecological examination without abnormal finding    2. Vulvovaginitis    3. Postmenopausal atrophic vaginitis    4. Menopause present    5. Screening for osteoporosis                Plan:        Problem List Items Addressed This Visit    None  Visit Diagnoses       Encounter for gynecological examination without abnormal finding    -  Primary  COUNSELING:  The patient was counseled today on regular weight bearing exercise. Patient was counseled today on the new ACS guidelines for cervical cytology screening as well as the current recommendations for breast cancer screening. Counseling session lasted approximately 10 minutes, and all her questions were answered. She was advised to see her primary care physician for all other health maintenance.   FOLLOW-UP with me for next routine visit.       Vulvovaginitis        Relevant Medications    clotrimazole-betamethasone 1-0.05% (LOTRISONE) cream    Postmenopausal atrophic vaginitis        Relevant Medications    estradioL (ESTRACE) 0.01 % (0.1 mg/gram) vaginal cream      Menopause present    - DXA Bone Density Axial Skeleton 1 or more sites;  Future    Screening for osteoporosis    - DXA Bone Density Axial Skeleton 1 or more sites; Future       Follow up in about 1 year (around 11/11/2025).

## 2024-11-13 ENCOUNTER — PATIENT MESSAGE (OUTPATIENT)
Dept: PRIMARY CARE CLINIC | Facility: CLINIC | Age: 64
End: 2024-11-13
Payer: COMMERCIAL

## 2024-11-13 ENCOUNTER — TELEPHONE (OUTPATIENT)
Dept: PRIMARY CARE CLINIC | Facility: CLINIC | Age: 64
End: 2024-11-13
Payer: COMMERCIAL

## 2024-11-13 DIAGNOSIS — Z00.01 ENCOUNTER FOR GENERAL ADULT MEDICAL EXAMINATION WITH ABNORMAL FINDINGS: Primary | ICD-10-CM

## 2024-11-13 NOTE — TELEPHONE ENCOUNTER
Sandra Mitchell has annual appt 11/26/24. Please see if she can have labs done prior to visit    Orders Placed This Encounter    TSH    Lipid Panel    Hemoglobin A1C    Comprehensive Metabolic Panel    CBC Auto Differential     Dr. Ligia Gracia D.O.   Family Medicine

## 2024-11-13 NOTE — PROGRESS NOTES
FAMILY MEDICINE  [facility]    Reason for visit:   Chief Complaint   Patient presents with    Annual Exam         SUBJECTIVE: Sandra Mitchell is a 64 y.o. female  - hyperlipidemia presents for her routine annual physical    Gynecology:  Nataly Valerio MD  Optometry:Kylie Saldana OD  Mammogram scheduled for 1/9/25    Sandra Mitchell reports that she continues to do well.  She has changed from semaglutide to tirzepatide for weight loss.  She reports that she takes an injection about once a month to maintain her weight and follows with the weight loss clinic.  She tries to maintain size.  She enjoys swimming.    She notes a long history of osteopenia since she was 42 years old.  However she reports that she has a high deductible plan and does not want to repeat her bone density until 67 years old.    1. Hyperlipidemia  - no other risk factors  - LDL goal < 100    Current treatment:  atorvastatin (LIPITOR) 40 MG tablet, Take 1 tablet (40 mg total) by mouth once daily., Disp: 90 tablet, Rfl: 3    Side effects from current treatment:  Denies    Exercise: moderately active  Diet: cardiac diet    Family history of CAD:Father  Family history of CVA: No    Lab Results       Component                Value               Date                       CHOL                     174                 11/15/2024                 CHOL                     179                 11/22/2023                 CHOL                     191                 02/15/2023            Lab Results       Component                Value               Date                       HDL                      73                  11/15/2024                 HDL                      76 (H)              11/22/2023                 HDL                      69                  02/15/2023            Lab Results       Component                Value               Date                       LDLCALC                  88.6                 11/15/2024                 LDLCALC                  87.2                11/22/2023                 LDLCALC                  105.2               02/15/2023            Lab Results       Component                Value               Date                       TRIG                     62                  11/15/2024                 TRIG                     79                  11/22/2023                 TRIG                     84                  02/15/2023              Lab Results       Component                Value               Date                       CHOLHDL                  42.0                11/15/2024                 CHOLHDL                  42.5                11/22/2023                 CHOLHDL                  36.1                02/15/2023                          Review of Systems   All other systems reviewed and are negative.      HEALTH MAINTENANCE:   Health Maintenance   Topic Date Due    Mammogram  01/09/2025    Colorectal Cancer Screening  03/13/2026    TETANUS VACCINE  10/18/2026    Lipid Panel  11/15/2029    Hepatitis C Screening  Completed    Shingles Vaccine  Completed     Health Maintenance Topics with due status: Not Due       Topic Last Completion Date    TETANUS VACCINE 10/18/2016    Cervical Cancer Screening 10/20/2022    Colorectal Cancer Screening 03/13/2023    Lipid Panel 11/15/2024    RSV Vaccine (Age 60+ and Pregnant patients) Not Due     Health Maintenance Due   Topic Date Due    Mammogram  01/09/2025       HISTORY:   Past Medical History:   Diagnosis Date    Laceration of left thumb 03/11/2021       Past Surgical History:   Procedure Laterality Date    APPENDECTOMY      BREAST CYST ASPIRATION      BREAST CYST INCISION AND DRAINAGE      DILATION AND CURETTAGE OF UTERUS      TUBAL LIGATION      Bilateral salpingectomy       Family History   Problem Relation Name Age of Onset    Cataracts Mother 83     Dementia Mother 83     Alzheimer's disease Mother 83     Cataracts Father  "Cesar Mitchell     Macular degeneration Father Cesar Mitchell     Heart disease Father Cesar Mitchell         CABG    Diabetes Father Cesar Mitchell     Hypertension Father Cesar Mitchell     Hearing loss Father Cesar Mitchell     Vision loss Father Cesar Mitchell     Cataracts Sister      Endometriosis Sister      Glaucoma Brother      Breast cancer Paternal Aunt      Amblyopia Neg Hx      Blindness Neg Hx      Retinal detachment Neg Hx      Strabismus Neg Hx      Colon cancer Neg Hx      Ovarian cancer Neg Hx      Stroke Neg Hx         Social History     Tobacco Use    Smoking status: Never    Smokeless tobacco: Never   Substance Use Topics    Alcohol use: Yes     Alcohol/week: 2.0 standard drinks of alcohol     Types: 2 Glasses of wine per week     Comment: per week    Drug use: No       Social History     Social History Narrative    .  independent and contracts with [employer]. 1/2025 plans to retire. No children. 2 dogs (rat terrier and judi) and 1 cat. Enjoys swimming       ALLERGIES:   Review of patient's allergies indicates:   Allergen Reactions    Aspirin Hives     Other reaction(s): Hives       MEDS:   Current Outpatient Medications on File Prior to Visit   Medication Sig Dispense Refill Last Dose/Taking    clotrimazole-betamethasone 1-0.05% (LOTRISONE) cream Apply to affected area 2 times daily 15 g 3 Taking    estradioL (ESTRACE) 0.01 % (0.1 mg/gram) vaginal cream Place 1 g vaginally every other day. 42 g 5 Taking    SF 5000 PLUS 1.1 % Crea   0 Taking    [DISCONTINUED] atorvastatin (LIPITOR) 40 MG tablet Take 1 tablet (40 mg total) by mouth once daily. 90 tablet 3 Taking    TIRZEPATIDE, WEIGHT LOSS, SUBQ Inject 2.5 mg into the skin every 30 days. Weekly prn weight increase            Vital signs:   Vitals:    11/26/24 0851   BP: 112/72   Pulse: 75   SpO2: 98%   Weight: 71.9 kg (158 lb 6.4 oz)   Height: 5' 10" (1.778 m)       Body mass index is 22.73 kg/m².    PHYSICAL EXAM:     Physical " Exam  Vitals reviewed.   Constitutional:       General: She is not in acute distress.  HENT:      Head: Normocephalic and atraumatic.      Right Ear: Tympanic membrane and ear canal normal.      Left Ear: Tympanic membrane and ear canal normal.   Eyes:      General: No scleral icterus.     Conjunctiva/sclera: Conjunctivae normal.   Neck:      Thyroid: No thyromegaly.      Vascular: No carotid bruit.   Cardiovascular:      Rate and Rhythm: Normal rate and regular rhythm.      Pulses: Normal pulses.      Heart sounds: Normal heart sounds. No murmur heard.     No friction rub. No gallop.   Pulmonary:      Effort: Pulmonary effort is normal.      Breath sounds: Normal breath sounds. No wheezing or rales.   Abdominal:      General: Bowel sounds are normal. There is no distension.      Palpations: Abdomen is soft.      Tenderness: There is no abdominal tenderness.   Musculoskeletal:      Cervical back: Normal range of motion and neck supple.      Right lower leg: No edema.      Left lower leg: No edema.   Lymphadenopathy:      Cervical: No cervical adenopathy.   Skin:     General: Skin is warm.      Capillary Refill: Capillary refill takes less than 2 seconds.   Neurological:      Mental Status: She is alert.             PERTINENT RESULTS:   No visits with results within 1 Week(s) from this visit.   Latest known visit with results is:   Lab Visit on 11/15/2024   Component Date Value Ref Range Status    TSH 11/15/2024 2.223  0.400 - 4.000 uIU/mL Final    Cholesterol 11/15/2024 174  120 - 199 mg/dL Final    Comment: The National Cholesterol Education Program (NCEP) has set the  following guidelines (reference ranges) for Cholesterol:  Optimal.....................<200 mg/dL  Borderline High.............200-239 mg/dL  High........................> or = 240 mg/dL      Triglycerides 11/15/2024 62  30 - 150 mg/dL Final    Comment: The National Cholesterol Education Program (NCEP) has set the  following guidelines (reference  values) for triglycerides:  Normal......................<150 mg/dL  Borderline High.............150-199 mg/dL  High........................200-499 mg/dL      HDL 11/15/2024 73  40 - 75 mg/dL Final    Comment: The National Cholesterol Education Program (NCEP) has set the  following guidelines (reference values) for HDL Cholesterol:  Low...............<40 mg/dL  Optimal...........>60 mg/dL      LDL Cholesterol 11/15/2024 88.6  63.0 - 159.0 mg/dL Final    Comment: The National Cholesterol Education Program (NCEP) has set the  following guidelines (reference values) for LDL Cholesterol:  Optimal.......................<130 mg/dL  Borderline High...............130-159 mg/dL  High..........................160-189 mg/dL  Very High.....................>190 mg/dL      HDL/Cholesterol Ratio 11/15/2024 42.0  20.0 - 50.0 % Final    Total Cholesterol/HDL Ratio 11/15/2024 2.4  2.0 - 5.0 Final    Non-HDL Cholesterol 11/15/2024 101  mg/dL Final    Comment: Risk category and Non-HDL cholesterol goals:  Coronary heart disease (CHD)or equivalent (10-year risk of CHD >20%):  Non-HDL cholesterol goal     <130 mg/dL  Two or more CHD risk factors and 10-year risk of CHD <= 20%:  Non-HDL cholesterol goal     <160 mg/dL  0 to 1 CHD risk factor:  Non-HDL cholesterol goal     <190 mg/dL      Hemoglobin A1C 11/15/2024 5.2  4.0 - 5.6 % Final    Comment: ADA Screening Guidelines:  5.7-6.4%  Consistent with prediabetes  >or=6.5%  Consistent with diabetes    High levels of fetal hemoglobin interfere with the HbA1C  assay. Heterozygous hemoglobin variants (HbS, HgC, etc)do  not significantly interfere with this assay.   However, presence of multiple variants may affect accuracy.      Estimated Avg Glucose 11/15/2024 103  68 - 131 mg/dL Final    Sodium 11/15/2024 140  136 - 145 mmol/L Final    Potassium 11/15/2024 4.6  3.5 - 5.1 mmol/L Final    Chloride 11/15/2024 103  95 - 110 mmol/L Final    CO2 11/15/2024 26  23 - 29 mmol/L Final    Glucose  11/15/2024 82  70 - 110 mg/dL Final    BUN 11/15/2024 10  8 - 23 mg/dL Final    Creatinine 11/15/2024 0.8  0.5 - 1.4 mg/dL Final    Calcium 11/15/2024 9.9  8.7 - 10.5 mg/dL Final    Total Protein 11/15/2024 7.2  6.0 - 8.4 g/dL Final    Albumin 11/15/2024 4.4  3.5 - 5.2 g/dL Final    Total Bilirubin 11/15/2024 0.5  0.1 - 1.0 mg/dL Final    Comment: For infants and newborns, interpretation of results should be based  on gestational age, weight and in agreement with clinical  observations.    Premature Infant recommended reference ranges:  Up to 24 hours.............<8.0 mg/dL  Up to 48 hours............<12.0 mg/dL  3-5 days..................<15.0 mg/dL  6-29 days.................<15.0 mg/dL      Alkaline Phosphatase 11/15/2024 75  40 - 150 U/L Final    AST 11/15/2024 24  10 - 40 U/L Final    ALT 11/15/2024 23  10 - 44 U/L Final    eGFR 11/15/2024 >60.0  >60 mL/min/1.73 m^2 Final    Anion Gap 11/15/2024 11  8 - 16 mmol/L Final    WBC 11/15/2024 7.50  3.90 - 12.70 K/uL Final    RBC 11/15/2024 4.58  4.00 - 5.40 M/uL Final    Hemoglobin 11/15/2024 13.4  12.0 - 16.0 g/dL Final    Hematocrit 11/15/2024 41.1  37.0 - 48.5 % Final    MCV 11/15/2024 90  82 - 98 fL Final    MCH 11/15/2024 29.3  27.0 - 31.0 pg Final    MCHC 11/15/2024 32.6  32.0 - 36.0 g/dL Final    RDW 11/15/2024 13.7  11.5 - 14.5 % Final    Platelets 11/15/2024 446  150 - 450 K/uL Final    MPV 11/15/2024 9.7  9.2 - 12.9 fL Final    Immature Granulocytes 11/15/2024 0.3  0.0 - 0.5 % Final    Gran # (ANC) 11/15/2024 3.7  1.8 - 7.7 K/uL Final    Immature Grans (Abs) 11/15/2024 0.02  0.00 - 0.04 K/uL Final    Comment: Mild elevation in immature granulocytes is non specific and   can be seen in a variety of conditions including stress response,   acute inflammation, trauma and pregnancy. Correlation with other   laboratory and clinical findings is essential.      Lymph # 11/15/2024 2.6  1.0 - 4.8 K/uL Final    Mono # 11/15/2024 0.7  0.3 - 1.0 K/uL Final    Eos #  11/15/2024 0.4  0.0 - 0.5 K/uL Final    Baso # 11/15/2024 0.15  0.00 - 0.20 K/uL Final    nRBC 11/15/2024 0  0 /100 WBC Final    Gran % 11/15/2024 48.9  38.0 - 73.0 % Final    Lymph % 11/15/2024 34.0  18.0 - 48.0 % Final    Mono % 11/15/2024 9.3  4.0 - 15.0 % Final    Eosinophil % 11/15/2024 5.5  0.0 - 8.0 % Final    Basophil % 11/15/2024 2.0 (H)  0.0 - 1.9 % Final    Differential Method 11/15/2024 Automated   Final     Reading Physician Reading Date Result Priority   Demond Jacobs MD  [phone] 1/2/2023 Routine     Physician Responsible for MQSA Outcome Reason    Demond Jacobs MD Signed      Narrative & Impression  Result:  Mammo Digital Screening Bilat w/ Josiah     History:  Patient is 62 y.o. and is seen for a screening mammogram.        Films Compared:  Prior images (if available) were compared.      Findings:   This procedure was performed using tomosynthesis.   Computer-aided detection was utilized in the interpretation of this examination.     The breasts are heterogeneously dense, which may obscure small masses. There is no evidence of suspicious masses, microcalcifications or architectural distortion.     Impression:   No mammographic evidence of malignancy.     BI-RADS Category 1: Negative     Recommendation:  Routine screening mammogram in 1 year is recommended.     Your estimated lifetime risk of breast cancer (to age 85) based on Tyrer-Cuzick risk assessment model is 13.33 %.  According to the American Cancer Society, patients with a lifetime breast cancer risk of 20% or higher might benefit from supplemental screening tests. ??                     Exam Ended: 12/28/22 08:50 Last Resulted: 01/02/23 04:14           Lab Results   Component Value Date    CHOL 174 11/15/2024    CHOL 179 11/22/2023    CHOL 191 02/15/2023     Lab Results   Component Value Date    HDL 73 11/15/2024    HDL 76 (H) 11/22/2023    HDL 69 02/15/2023     Lab Results   Component Value Date    LDLCALC 88.6 11/15/2024     LDLCALC 87.2 11/22/2023    LDLCALC 105.2 02/15/2023     Lab Results   Component Value Date    TRIG 62 11/15/2024    TRIG 79 11/22/2023    TRIG 84 02/15/2023       Lab Results   Component Value Date    CHOLHDL 42.0 11/15/2024    CHOLHDL 42.5 11/22/2023    CHOLHDL 36.1 02/15/2023     CMP  Sodium   Date Value Ref Range Status   11/15/2024 140 136 - 145 mmol/L Final     Potassium   Date Value Ref Range Status   11/15/2024 4.6 3.5 - 5.1 mmol/L Final     Chloride   Date Value Ref Range Status   11/15/2024 103 95 - 110 mmol/L Final     CO2   Date Value Ref Range Status   11/15/2024 26 23 - 29 mmol/L Final     Glucose   Date Value Ref Range Status   11/15/2024 82 70 - 110 mg/dL Final     BUN   Date Value Ref Range Status   11/15/2024 10 8 - 23 mg/dL Final     Creatinine   Date Value Ref Range Status   11/15/2024 0.8 0.5 - 1.4 mg/dL Final     Calcium   Date Value Ref Range Status   11/15/2024 9.9 8.7 - 10.5 mg/dL Final     Total Protein   Date Value Ref Range Status   11/15/2024 7.2 6.0 - 8.4 g/dL Final     Albumin   Date Value Ref Range Status   11/15/2024 4.4 3.5 - 5.2 g/dL Final     Total Bilirubin   Date Value Ref Range Status   11/15/2024 0.5 0.1 - 1.0 mg/dL Final     Comment:     For infants and newborns, interpretation of results should be based  on gestational age, weight and in agreement with clinical  observations.    Premature Infant recommended reference ranges:  Up to 24 hours.............<8.0 mg/dL  Up to 48 hours............<12.0 mg/dL  3-5 days..................<15.0 mg/dL  6-29 days.................<15.0 mg/dL       Alkaline Phosphatase   Date Value Ref Range Status   11/15/2024 75 40 - 150 U/L Final     AST   Date Value Ref Range Status   11/15/2024 24 10 - 40 U/L Final     ALT   Date Value Ref Range Status   11/15/2024 23 10 - 44 U/L Final     Anion Gap   Date Value Ref Range Status   11/15/2024 11 8 - 16 mmol/L Final     eGFR   Date Value Ref Range Status   11/15/2024 >60.0 >60 mL/min/1.73 m^2 Final      Lab Results   Component Value Date    HGBA1C 5.2 11/15/2024         ASSESSMENT/PLAN:    1. Encounter for general adult medical examination with abnormal findings  Overview:  - preventative health counseling  - counseling on current recommendations for breast cancer screening. Scheduled for 1/9/25 [place]: 10.75%  - counseling on current recommendations for cervical cancer screening. Due 10/2027  - counseling on current recommendations for colon cancer screening. Due 3/13/26. 3/2023 Cologuard negative      Orders:  -     TSH; Future; Expected date: 11/01/2025  -     Lipid Panel; Future; Expected date: 11/01/2025  -     Hemoglobin A1C; Future; Expected date: 11/01/2025  -     Comprehensive Metabolic Panel; Future; Expected date: 11/01/2025  -     CBC Auto Differential; Future; Expected date: 11/01/2025    2. Mixed hyperlipidemia  Overview:  Lab Results   Component Value Date    LDLCALC 88.6 11/15/2024     - starting   -discussed she may have familial hyperlipidemia based off of her initial LDL in 2021  - well controlled  - continue current management plan   - patient encouraged to notify me with any changes    Orders:  -     atorvastatin (LIPITOR) 40 MG tablet; Take 1 tablet (40 mg total) by mouth once daily.  Dispense: 90 tablet; Refill: 3          ORDERS:   Orders Placed This Encounter    TSH    Lipid Panel    Hemoglobin A1C    Comprehensive Metabolic Panel    CBC Auto Differential    atorvastatin (LIPITOR) 40 MG tablet         Vaccines recommended:  RSV    Follow up in about 1 year (around 11/26/2025) for Annual.or sooner if any concerns.      This note is dictated using the M*Modal Fluency Direct word recognition program. There are word recognition mistakes that are occasionally missed on review.    Dr. Ligia Gracia D.O.   Family Medicine

## 2024-11-15 ENCOUNTER — LAB VISIT (OUTPATIENT)
Dept: LAB | Facility: HOSPITAL | Age: 64
End: 2024-11-15
Attending: FAMILY MEDICINE
Payer: COMMERCIAL

## 2024-11-15 DIAGNOSIS — Z00.01 ENCOUNTER FOR GENERAL ADULT MEDICAL EXAMINATION WITH ABNORMAL FINDINGS: ICD-10-CM

## 2024-11-15 LAB
ALBUMIN SERPL BCP-MCNC: 4.4 G/DL (ref 3.5–5.2)
ALP SERPL-CCNC: 75 U/L (ref 40–150)
ALT SERPL W/O P-5'-P-CCNC: 23 U/L (ref 10–44)
ANION GAP SERPL CALC-SCNC: 11 MMOL/L (ref 8–16)
AST SERPL-CCNC: 24 U/L (ref 10–40)
BASOPHILS # BLD AUTO: 0.15 K/UL (ref 0–0.2)
BASOPHILS NFR BLD: 2 % (ref 0–1.9)
BILIRUB SERPL-MCNC: 0.5 MG/DL (ref 0.1–1)
BUN SERPL-MCNC: 10 MG/DL (ref 8–23)
CALCIUM SERPL-MCNC: 9.9 MG/DL (ref 8.7–10.5)
CHLORIDE SERPL-SCNC: 103 MMOL/L (ref 95–110)
CHOLEST SERPL-MCNC: 174 MG/DL (ref 120–199)
CHOLEST/HDLC SERPL: 2.4 {RATIO} (ref 2–5)
CO2 SERPL-SCNC: 26 MMOL/L (ref 23–29)
CREAT SERPL-MCNC: 0.8 MG/DL (ref 0.5–1.4)
DIFFERENTIAL METHOD BLD: ABNORMAL
EOSINOPHIL # BLD AUTO: 0.4 K/UL (ref 0–0.5)
EOSINOPHIL NFR BLD: 5.5 % (ref 0–8)
ERYTHROCYTE [DISTWIDTH] IN BLOOD BY AUTOMATED COUNT: 13.7 % (ref 11.5–14.5)
EST. GFR  (NO RACE VARIABLE): >60 ML/MIN/1.73 M^2
ESTIMATED AVG GLUCOSE: 103 MG/DL (ref 68–131)
GLUCOSE SERPL-MCNC: 82 MG/DL (ref 70–110)
HBA1C MFR BLD: 5.2 % (ref 4–5.6)
HCT VFR BLD AUTO: 41.1 % (ref 37–48.5)
HDLC SERPL-MCNC: 73 MG/DL (ref 40–75)
HDLC SERPL: 42 % (ref 20–50)
HGB BLD-MCNC: 13.4 G/DL (ref 12–16)
IMM GRANULOCYTES # BLD AUTO: 0.02 K/UL (ref 0–0.04)
IMM GRANULOCYTES NFR BLD AUTO: 0.3 % (ref 0–0.5)
LDLC SERPL CALC-MCNC: 88.6 MG/DL (ref 63–159)
LYMPHOCYTES # BLD AUTO: 2.6 K/UL (ref 1–4.8)
LYMPHOCYTES NFR BLD: 34 % (ref 18–48)
MCH RBC QN AUTO: 29.3 PG (ref 27–31)
MCHC RBC AUTO-ENTMCNC: 32.6 G/DL (ref 32–36)
MCV RBC AUTO: 90 FL (ref 82–98)
MONOCYTES # BLD AUTO: 0.7 K/UL (ref 0.3–1)
MONOCYTES NFR BLD: 9.3 % (ref 4–15)
NEUTROPHILS # BLD AUTO: 3.7 K/UL (ref 1.8–7.7)
NEUTROPHILS NFR BLD: 48.9 % (ref 38–73)
NONHDLC SERPL-MCNC: 101 MG/DL
NRBC BLD-RTO: 0 /100 WBC
PLATELET # BLD AUTO: 446 K/UL (ref 150–450)
PMV BLD AUTO: 9.7 FL (ref 9.2–12.9)
POTASSIUM SERPL-SCNC: 4.6 MMOL/L (ref 3.5–5.1)
PROT SERPL-MCNC: 7.2 G/DL (ref 6–8.4)
RBC # BLD AUTO: 4.58 M/UL (ref 4–5.4)
SODIUM SERPL-SCNC: 140 MMOL/L (ref 136–145)
TRIGL SERPL-MCNC: 62 MG/DL (ref 30–150)
TSH SERPL DL<=0.005 MIU/L-ACNC: 2.22 UIU/ML (ref 0.4–4)
WBC # BLD AUTO: 7.5 K/UL (ref 3.9–12.7)

## 2024-11-15 PROCEDURE — 85025 COMPLETE CBC W/AUTO DIFF WBC: CPT | Performed by: FAMILY MEDICINE

## 2024-11-15 PROCEDURE — 80061 LIPID PANEL: CPT | Performed by: FAMILY MEDICINE

## 2024-11-15 PROCEDURE — 84443 ASSAY THYROID STIM HORMONE: CPT | Performed by: FAMILY MEDICINE

## 2024-11-15 PROCEDURE — 83036 HEMOGLOBIN GLYCOSYLATED A1C: CPT | Performed by: FAMILY MEDICINE

## 2024-11-15 PROCEDURE — 36415 COLL VENOUS BLD VENIPUNCTURE: CPT | Performed by: FAMILY MEDICINE

## 2024-11-15 PROCEDURE — 80053 COMPREHEN METABOLIC PANEL: CPT | Performed by: FAMILY MEDICINE

## 2024-11-26 ENCOUNTER — OFFICE VISIT (OUTPATIENT)
Dept: PRIMARY CARE CLINIC | Facility: CLINIC | Age: 64
End: 2024-11-26
Attending: FAMILY MEDICINE
Payer: COMMERCIAL

## 2024-11-26 VITALS
WEIGHT: 158.38 LBS | HEIGHT: 70 IN | BODY MASS INDEX: 22.67 KG/M2 | OXYGEN SATURATION: 98 % | HEART RATE: 75 BPM | SYSTOLIC BLOOD PRESSURE: 112 MMHG | DIASTOLIC BLOOD PRESSURE: 72 MMHG

## 2024-11-26 DIAGNOSIS — Z00.01 ENCOUNTER FOR GENERAL ADULT MEDICAL EXAMINATION WITH ABNORMAL FINDINGS: Primary | ICD-10-CM

## 2024-11-26 DIAGNOSIS — E78.2 MIXED HYPERLIPIDEMIA: ICD-10-CM

## 2024-11-26 PROBLEM — D75.839 THROMBOCYTOSIS: Status: RESOLVED | Noted: 2023-11-27 | Resolved: 2024-11-26

## 2024-11-26 PROCEDURE — 3078F DIAST BP <80 MM HG: CPT | Mod: CPTII,S$GLB,, | Performed by: FAMILY MEDICINE

## 2024-11-26 PROCEDURE — 3074F SYST BP LT 130 MM HG: CPT | Mod: CPTII,S$GLB,, | Performed by: FAMILY MEDICINE

## 2024-11-26 PROCEDURE — 1159F MED LIST DOCD IN RCRD: CPT | Mod: CPTII,S$GLB,, | Performed by: FAMILY MEDICINE

## 2024-11-26 PROCEDURE — 3008F BODY MASS INDEX DOCD: CPT | Mod: CPTII,S$GLB,, | Performed by: FAMILY MEDICINE

## 2024-11-26 PROCEDURE — 99396 PREV VISIT EST AGE 40-64: CPT | Mod: S$GLB,,, | Performed by: FAMILY MEDICINE

## 2024-11-26 PROCEDURE — 99999 PR PBB SHADOW E&M-EST. PATIENT-LVL III: CPT | Mod: PBBFAC,,, | Performed by: FAMILY MEDICINE

## 2024-11-26 PROCEDURE — 3044F HG A1C LEVEL LT 7.0%: CPT | Mod: CPTII,S$GLB,, | Performed by: FAMILY MEDICINE

## 2024-11-26 RX ORDER — ATORVASTATIN CALCIUM 40 MG/1
40 TABLET, FILM COATED ORAL DAILY
Qty: 90 TABLET | Refills: 3 | Status: SHIPPED | OUTPATIENT
Start: 2024-11-26

## 2025-01-09 ENCOUNTER — E-VISIT (OUTPATIENT)
Dept: PRIMARY CARE CLINIC | Facility: CLINIC | Age: 65
End: 2025-01-09
Attending: FAMILY MEDICINE
Payer: COMMERCIAL

## 2025-01-09 DIAGNOSIS — N30.00 ACUTE CYSTITIS WITHOUT HEMATURIA: Primary | ICD-10-CM

## 2025-01-09 RX ORDER — NITROFURANTOIN 25; 75 MG/1; MG/1
100 CAPSULE ORAL 2 TIMES DAILY
Qty: 10 CAPSULE | Refills: 0 | Status: SHIPPED | OUTPATIENT
Start: 2025-01-09 | End: 2025-01-14

## 2025-01-09 NOTE — PROGRESS NOTES
Patient ID: Sandra Mitchell is a 64 y.o. female.    Chief Complaint: General Illness (Entered automatically based on patient selection in [facility].) and Urinary Tract Infection      The patient initiated a request through [facility] on 1/9/2025 for evaluation and management with a chief complaint of General Illness (Entered automatically based on patient selection in [facility].) and Urinary Tract Infection     I evaluated the questionnaire submission on 1/9/25. I reviewed Sandra Mitchell 's medical history, allergies and current medications.     Patient Active Problem List   Diagnosis    Lumbar radiculopathy    Postmenopausal HRT (hormone replacement therapy)    Mixed hyperlipidemia    Wears contact lenses    Encounter for general adult medical examination with abnormal findings       Review of patient's allergies indicates:   Allergen Reactions    Aspirin Hives     Other reaction(s): Hives       [facility] Peq Evisit Supergroup-Common Problems    1/9/2025  5:49 AM CST - Filed by Patient   What do you need help with? Urinary Symptoms   Do you agree to participate in an E-Visit? Yes   If you have any of the following symptoms, please present to your local emergency room or call 911:  I acknowledge   What is the main issue you would like addressed today? I tested positive for lucocytes on a urine test strip. Id like an antibiotic for this   What symptoms do you currently have? Pain while passing urine   When did your symptoms first appear? 1/7/2025   List what you have done or taken to help your symptoms. First tested for type of uti. Positive for lucocytes. Then started w AZO to relieve symptoms. These include frequent urge to urinate, difficulty holding urone back and pain w urination. I believe i contracted in the hot tub at [facility]   Please indicate whether you have had the following symptoms during the past 24 hours     Urgent urination (a sudden and uncontrollable urge to urinate) Severe    Frequent urination of small amounts of urine (going to the toilet very often) Moderate   Burning pain when urinating Moderate   Incomplete bladder emptying (still feel like you need to urinate again after urination) None   Pain not associated with urination in the lower abdomen below the belly button) None   What does your urine look like? Cloudy   Blood seen in the urine None   Flank pain (pain in one or both sides of the lower back) None   Abnormal Vaginal Discharge (abnormal amount, color and/or odor) None   Discharge from the urethra (urinary opening) without urination None   High body temperature/fever? None-<99.5   Please rate how much discomfort you have experience because of the symptoms in the past 24 hours: Severe   Please indicate how the symptoms have interfered with your every day activities/work in the past 24 hours: Moderate   Please indicate how these symptoms have interfered with your social activities (visiting people, meeting with friends, etc.) in the past 24 hours? Severe   Are you a diabetic? No   Please indicate whether you have the following at the time of completion of this questionnaire: None of the above   Provide any additional information you feel is important. I am working in [facility], 7am-4pm. I hope to be able to take the call. But really need a script. Theres a [facility] at [address] [place] [address] that i can retrieve meds at end of day. I will be out here through Friday and Mon-Wed next week   Please attach any relevant images or files (if you have performed a home test for UTI, please submit a photo of results)    Are you able to take your vital signs? No         1. Acute cystitis without hematuria  -     nitrofurantoin, macrocrystal-monohydrate, (MACROBID) 100 MG capsule; Take 1 capsule (100 mg total) by mouth 2 (two) times daily. for 5 days  Dispense: 10 capsule; Refill: 0      - See [facility] [facility] Message    Orders Placed This Encounter    nitrofurantoin,  macrocrystal-monohydrate, (MACROBID) 100 MG capsule       Follow up if symptoms worsen or fail to improve.    E-Visit Time Tracking:    Day 1 Time (in minutes): 12    Total Time (in minutes): 12

## 2025-01-17 ENCOUNTER — HOSPITAL ENCOUNTER (OUTPATIENT)
Dept: RADIOLOGY | Facility: HOSPITAL | Age: 65
Discharge: HOME | End: 2025-01-17
Attending: FAMILY MEDICINE
Payer: COMMERCIAL

## 2025-01-17 ENCOUNTER — TELEPHONE (OUTPATIENT)
Dept: OBSTETRICS AND GYNECOLOGY | Facility: CLINIC | Age: 65
End: 2025-01-17
Payer: COMMERCIAL

## 2025-01-17 DIAGNOSIS — R92.8 ABNORMAL MAMMOGRAM: Primary | ICD-10-CM

## 2025-01-17 DIAGNOSIS — N76.0 VULVOVAGINITIS: Primary | ICD-10-CM

## 2025-01-17 DIAGNOSIS — Z12.31 ENCOUNTER FOR SCREENING MAMMOGRAM FOR BREAST CANCER: ICD-10-CM

## 2025-01-17 PROCEDURE — 77063 BREAST TOMOSYNTHESIS BI: CPT | Mod: 26,,, | Performed by: RADIOLOGY

## 2025-01-17 PROCEDURE — 77067 SCR MAMMO BI INCL CAD: CPT | Mod: 26,,, | Performed by: RADIOLOGY

## 2025-01-17 PROCEDURE — 77063 BREAST TOMOSYNTHESIS BI: CPT | Mod: TC

## 2025-01-17 RX ORDER — FLUCONAZOLE 150 MG/1
150 TABLET ORAL DAILY
Qty: 2 TABLET | Refills: 0 | Status: SHIPPED | OUTPATIENT
Start: 2025-01-17

## 2025-01-17 NOTE — TELEPHONE ENCOUNTER
----- Message from Nurse Garnett sent at 1/9/2025  8:24 AM CST -----    ----- Message -----  From: Skylar Hill  Sent: 1/8/2025   5:37 PM CST  To: [facility] Staff    Type:  Needs Medical Advice    Who Called: EVAN CHINCHILLA [[id number]]    Pharmacy name and phone #:  [id number] [address]  Phone: [phone]  Fax: [phone]    Would the patient rather a call back or a response via [facility]? Call back    Best Call Back Number: [phone]     Additional Information: Patient states she spoke with a nurse about yeast infection medication patient would like a medication sent over to pharmacy above if possible. Patient would like a call back with further information and assistance.

## 2025-01-24 ENCOUNTER — TELEPHONE (OUTPATIENT)
Dept: RADIOLOGY | Facility: HOSPITAL | Age: 65
End: 2025-01-24
Payer: COMMERCIAL

## 2025-01-27 ENCOUNTER — TELEPHONE (OUTPATIENT)
Dept: RADIOLOGY | Facility: HOSPITAL | Age: 65
End: 2025-01-27
Payer: COMMERCIAL

## 2025-01-28 ENCOUNTER — PATIENT MESSAGE (OUTPATIENT)
Dept: RADIOLOGY | Facility: HOSPITAL | Age: 65
End: 2025-01-28
Payer: COMMERCIAL

## 2025-01-29 ENCOUNTER — TELEPHONE (OUTPATIENT)
Dept: RADIOLOGY | Facility: HOSPITAL | Age: 65
End: 2025-01-29
Payer: COMMERCIAL

## 2025-01-29 NOTE — TELEPHONE ENCOUNTER
Yes please reach out to try scheduling. However it looks like the breast imaging department has not been able to reach her but hopefully you will have better luck. Thank you for your help

## 2025-02-19 ENCOUNTER — PATIENT MESSAGE (OUTPATIENT)
Dept: OPTOMETRY | Facility: CLINIC | Age: 65
End: 2025-02-19
Payer: COMMERCIAL

## 2025-02-19 ENCOUNTER — TELEPHONE (OUTPATIENT)
Dept: OPTOMETRY | Facility: CLINIC | Age: 65
End: 2025-02-19
Payer: COMMERCIAL

## 2025-02-21 ENCOUNTER — HOSPITAL ENCOUNTER (OUTPATIENT)
Dept: RADIOLOGY | Facility: OTHER | Age: 65
Discharge: HOME | End: 2025-02-21
Payer: COMMERCIAL

## 2025-02-21 ENCOUNTER — RESULTS FOLLOW-UP (OUTPATIENT)
Dept: PRIMARY CARE CLINIC | Facility: CLINIC | Age: 65
End: 2025-02-21

## 2025-02-21 DIAGNOSIS — R92.8 ABNORMAL MAMMOGRAM: ICD-10-CM

## 2025-02-21 PROCEDURE — 77065 DX MAMMO INCL CAD UNI: CPT | Mod: 26,LT,, | Performed by: RADIOLOGY

## 2025-02-21 PROCEDURE — 77061 BREAST TOMOSYNTHESIS UNI: CPT | Mod: TC,LT

## 2025-02-21 PROCEDURE — 77061 BREAST TOMOSYNTHESIS UNI: CPT | Mod: 26,LT,, | Performed by: RADIOLOGY

## 2025-03-21 ENCOUNTER — OFFICE VISIT (OUTPATIENT)
Dept: OPTOMETRY | Facility: CLINIC | Age: 65
End: 2025-03-21
Payer: COMMERCIAL

## 2025-03-21 DIAGNOSIS — H25.13 NUCLEAR SCLEROSIS, BILATERAL: Primary | ICD-10-CM

## 2025-03-21 DIAGNOSIS — Z83.511 FAMILY HISTORY OF GLAUCOMA IN BROTHER: ICD-10-CM

## 2025-03-21 DIAGNOSIS — Z83.518 FAMILY HISTORY OF MACULAR DEGENERATION: ICD-10-CM

## 2025-03-21 DIAGNOSIS — Z46.0 FITTING AND ADJUSTMENT OF SPECTACLES AND CONTACT LENSES: Primary | ICD-10-CM

## 2025-03-21 DIAGNOSIS — H52.4 MYOPIA OF BOTH EYES WITH ASTIGMATISM AND PRESBYOPIA: ICD-10-CM

## 2025-03-21 DIAGNOSIS — Z97.3 WEARS CONTACT LENSES: ICD-10-CM

## 2025-03-21 DIAGNOSIS — H52.13 MYOPIA OF BOTH EYES WITH ASTIGMATISM AND PRESBYOPIA: ICD-10-CM

## 2025-03-21 DIAGNOSIS — H52.203 MYOPIA OF BOTH EYES WITH ASTIGMATISM AND PRESBYOPIA: ICD-10-CM

## 2025-03-21 PROCEDURE — 99999 PR PBB SHADOW E&M-EST. PATIENT-LVL III: CPT | Mod: PBBFAC,,, | Performed by: OPTOMETRIST

## 2025-03-21 NOTE — PROGRESS NOTES
HPI    65 Y/o female is here for routine eye exam with C/o pt say's when wearing   trifocal lens she notices that her near vision has been a little blurry   and she cannot see small print as well as before.  Pt denies pain and discomfort   Occ flashes and floaters     Eye med: Lumify OU QD   Last edited by Ashwini Hathaway MA on 3/21/2025  9:04 AM.            Assessment /Plan     For exam results, see Encounter Report.    Nuclear sclerosis, bilateral    Family history of glaucoma in brother    Family history of macular degeneration    Myopia of both eyes with astigmatism and presbyopia    Wears contact lenses        Slight hyp shift 2 to Cat OU--wrote new spex Rx--pt has lined trifocals, but also lined bifocals w top for computer/bottom for near  Fam hx glaucoma/ARMD--advised yearly exam  Good fit/VA w DT1 Toric (OS near)--adjusted power to reflect todays refraction    PLAN:    DISP trial Cls--5 pairs  Continue Daily Wear schedule.  NO SLEEPING IN CONTACT LENSES. Exchange daily  if happy will release CLRx, rtc 1 yr

## 2025-03-24 ENCOUNTER — PATIENT MESSAGE (OUTPATIENT)
Dept: OPTOMETRY | Facility: CLINIC | Age: 65
End: 2025-03-24
Payer: COMMERCIAL

## 2025-04-02 ENCOUNTER — PATIENT MESSAGE (OUTPATIENT)
Dept: PRIMARY CARE CLINIC | Facility: CLINIC | Age: 65
End: 2025-04-02
Payer: COMMERCIAL

## 2025-04-04 ENCOUNTER — PATIENT MESSAGE (OUTPATIENT)
Dept: FAMILY MEDICINE | Facility: CLINIC | Age: 65
End: 2025-04-04
Payer: COMMERCIAL

## 2025-08-14 ENCOUNTER — OFFICE VISIT (OUTPATIENT)
Dept: PRIMARY CARE CLINIC | Facility: CLINIC | Age: 65
End: 2025-08-14
Payer: COMMERCIAL

## 2025-08-14 VITALS
SYSTOLIC BLOOD PRESSURE: 104 MMHG | OXYGEN SATURATION: 99 % | DIASTOLIC BLOOD PRESSURE: 68 MMHG | BODY MASS INDEX: 22.93 KG/M2 | HEIGHT: 70 IN | WEIGHT: 160.19 LBS | HEART RATE: 73 BPM

## 2025-08-14 DIAGNOSIS — H61.23 BILATERAL IMPACTED CERUMEN: Primary | ICD-10-CM

## 2025-08-14 PROBLEM — Z00.01 ENCOUNTER FOR GENERAL ADULT MEDICAL EXAMINATION WITH ABNORMAL FINDINGS: Status: RESOLVED | Noted: 2024-11-13 | Resolved: 2025-08-14

## 2025-08-14 PROCEDURE — 3074F SYST BP LT 130 MM HG: CPT | Mod: CPTII,S$GLB,,

## 2025-08-14 PROCEDURE — 99999 PR PBB SHADOW E&M-EST. PATIENT-LVL III: CPT | Mod: PBBFAC,,,

## 2025-08-14 PROCEDURE — 1159F MED LIST DOCD IN RCRD: CPT | Mod: CPTII,S$GLB,,

## 2025-08-14 PROCEDURE — 3008F BODY MASS INDEX DOCD: CPT | Mod: CPTII,S$GLB,,

## 2025-08-14 PROCEDURE — 99214 OFFICE O/P EST MOD 30 MIN: CPT | Mod: S$GLB,,,

## 2025-08-14 PROCEDURE — 3078F DIAST BP <80 MM HG: CPT | Mod: CPTII,S$GLB,,
